# Patient Record
Sex: MALE | Race: WHITE | NOT HISPANIC OR LATINO | Employment: OTHER | URBAN - METROPOLITAN AREA
[De-identification: names, ages, dates, MRNs, and addresses within clinical notes are randomized per-mention and may not be internally consistent; named-entity substitution may affect disease eponyms.]

---

## 2020-02-26 NOTE — PROGRESS NOTES
======================================================  GOAL of current visit: Est Care/Annual Wellness Exam    Previous PCP: Razia Cordon @ [facility]  Specialists: Dermatology - Macy Antony @ [facility]; General Surgeon - Dr. Vipin arguelles; Urology - Dr. Shlomo Shane @ [place]  Recent lab work: February 2020  Recent imaging: February 2020  Colonoscopy History: needed    Health Maintenance Due   Topic Date Due    Foot Exam  12/18/1979    Eye Exam  12/18/1979    TETANUS VACCINE  12/18/1987    Pneumococcal Vaccine (Medium Risk) (1 of 1 - PPSV23) 12/18/1988    Low Dose Statin  12/18/1990    Colonoscopy  12/18/2019     ======================================================  PLAN:      Problem List Items Addressed This Visit     Type 2 diabetes mellitus with diabetic polyneuropathy, without long-term current use of insulin (Chronic)     Patient has uncontrolled diabetes and is at high risk.  Discussed in length course of illness with the patient.  Patient is agreeable to taking multiple medications if needed.  Referral to Nephrology, Podiatry, Optometry today.  Restart glipizide, start Jardiance and OZEMPIC.  Recheck A1c every three months until controlled.  Monitor hemoglobin A1c.  Discussed diabetic diet and exercise protocol.  Starting new medications.  Monitor for side effects.  Discussed checking blood glucose.  Discussed symptoms to monitor for and to notify me immediately if persistent or worsening.  Follow up with Ophthalmology/Optometry and Podiatry.           Relevant Medications    semaglutide (OZEMPIC) 0.25 mg or 0.5 mg(2 mg/1.5 mL) PnIj    empagliflozin (JARDIANCE) 25 mg Tab    glipiZIDE (GLUCOTROL) 5 MG tablet    Other Relevant Orders    MICROALBUMIN / CREATININE RATIO URINE (Completed)    Hemoglobin A1c    Ambulatory referral/consult to Optometry    Ambulatory referral/consult to Podiatry    Hemoglobin A1c    Depression, major, recurrent, moderate     Patient would like to focus on his other chronic  conditions 1st.  Patient reports that he defers any medication at this time.  He will consider counseling.Discussed depression condition course.  Discussed SSRI as first-line treatment for this condition.  Discussed risk of discontinuing this medication without tapering.  Patient was educated, advised of side effects, and all questions were answered.  Patient voiced understanding.  Patient will follow up routinely and notify us if having any side effects or worsening or persistent symptoms.  ER precautions were given.           Hyperlipidemia LDL goal <130     I recommend statin to reduce LDL below 70 with diabetes.  Vascepa is needed for reduction in triglycerides. Discussed hyperlipidemia disease course, healthy diet and increased need for exercise.  Discussed the risk of cardiovascular disease, increase stroke and heart attack risk.    Patient voiced understanding and understood the treatment plan. All questions were answered.                Relevant Medications    atorvastatin (LIPITOR) 20 MG tablet    icosapent ethyl (VASCEPA) 1 gram Cap    Other Relevant Orders    Lipid panel    Encounter for long-term (current) use of medications     Patient with multiple lab abnormalities and need medication adjustment.  Patient with hypothyroidism start medications see problem.  Uncontrolled diabetes see problem.  Otherwise blood counts and kidney and liver and electrolytes are stable.    Complete history and physical was completed today.  Complete and thorough medication reconciliation was performed.  Discussed risks and benefits of medications.  Advised patient on orders and health maintenance.  We discussed old records and old labs if available.  Will request any records not available through epic.  Continue current medications listed on your summary sheet.           Relevant Orders    CBC Without Differential (Completed)    Comprehensive metabolic panel (Completed)    TSH (Completed)    Urinalysis (Completed)     Encounter for general adult medical examination with abnormal findings - Primary    Relevant Orders    CBC Without Differential (Completed)    Comprehensive metabolic panel (Completed)    TSH (Completed)    Urinalysis (Completed)    Colon cancer screening    Relevant Orders    Case request GI: COLONOSCOPY (Completed)    Encounter for lipid screening for cardiovascular disease    Relevant Orders    Lipid panel (Completed)    Primary osteoarthritis of both knees     I recommend anti-inflammatories and physical therapy.  Referral to Orthopedics for further evaluation.         Relevant Orders    Ambulatory referral/consult to Orthopedics    Not currently working due to disabled status     Patient request handicap tag to be filled out.  Form was filled out copy placed in chart and original given to patient.         Hypothyroidism     Start Synthroid 25 mcg.  Recheck in 2 to 3 months.Discussed hypothyroidism disease course.  Discussed risks and benefits of medication use.  ER precautions.           Relevant Medications    levothyroxine (SYNTHROID) 25 MCG tablet        Future Appointments     Date Provider Specialty Appt Notes    3/11/2020 Nicolette Baeza NP Orthopedics Primary osteoarthritis of both knees    3/24/2020 Holli Traore DPM Podiatry Type 2 diabetes mellitus with diabetic polyneuropathy, without long-term current use of insulin    3/26/2020 Donnie Shrestha MD Family Medicine 4 week f/u review lab results               Donnie Shrestha M.D.     ==========================================================================  Subjective:      Patient ID: Rashaun Mccormick is a 49 y.o. male.  has a past medical history of Arthritis, Chronic pain, Chronic pain of both knees, and Diabetes mellitus, type 2.     Chief Complaint: Establish Care (update handicap tag)    Patient here today to establish care and for annual wellness exam.    Problem List Items Addressed This Visit     Type 2 diabetes mellitus with  diabetic polyneuropathy, without long-term current use of insulin (Chronic)    Overview     Chronic.  Uncontrolled.  Patient states that he has been on insulin and other medications in the past.  Specifically metformin gave him severe diarrhea at different doses and he cannot take this medication.  Patient is currently on glipizide but reports intermittent compliance.  Patient did have a recent illness that made his blood sugars go up.  No recent steroids.    Diabetes Management Status    Statin: Not taking  ACE/ARB: Not taking    Screening or Prevention Patient's value Goal Complete/Controlled?   HgA1C Testing and Control   Lab Results   Component Value Date    HGBA1C 10.8 (H) 02/27/2020      Annually/Less than 8% No   Lipid profile : 02/27/2020 Annually Yes   LDL control Lab Results   Component Value Date    LDLCALC 123.4 02/27/2020    Annually/Less than 100 mg/dl  No   Nephropathy screening Lab Results   Component Value Date    LABMICR 106.0 02/27/2020     Lab Results   Component Value Date    PROTEINUA Trace (A) 02/27/2020    Annually Yes   Blood pressure BP Readings from Last 1 Encounters:   02/27/20 130/88    Less than 140/90 Yes   Dilated retinal exam Most Recent Eye Exam Date: Not Found Annually No   Foot exam   Most Recent Foot Exam Date: Not Found Annually No              Current Assessment & Plan     Patient has uncontrolled diabetes and is at high risk.  Discussed in length course of illness with the patient.  Patient is agreeable to taking multiple medications if needed.  Referral to Nephrology, Podiatry, Optometry today.  Restart glipizide, start Jardiance and OZEMPIC.  Recheck A1c every three months until controlled.  Monitor hemoglobin A1c.  Discussed diabetic diet and exercise protocol.  Starting new medications.  Monitor for side effects.  Discussed checking blood glucose.  Discussed symptoms to monitor for and to notify me immediately if persistent or worsening.  Follow up with  Ophthalmology/Optometry and Podiatry.           Depression, major, recurrent, moderate    Overview     Chronic.  Intermittent controlled.  Patient states that he does not wish to take any medications for this condition.  Patient cash with exercise and self meditation.  Denies any SI HI or hallucinations.         Current Assessment & Plan     Patient would like to focus on his other chronic conditions 1st.  Patient reports that he defers any medication at this time.  He will consider counseling.Discussed depression condition course.  Discussed SSRI as first-line treatment for this condition.  Discussed risk of discontinuing this medication without tapering.  Patient was educated, advised of side effects, and all questions were answered.  Patient voiced understanding.  Patient will follow up routinely and notify us if having any side effects or worsening or persistent symptoms.  ER precautions were given.           Hyperlipidemia LDL goal <130    Overview     CHRONIC.  Uncontrolled. Lab analysis reviewed.   February 2020:  Patient is on no medications currently.  Patient reports eating a very well controlled diet.  (-) CP, SOB, abdominal pain, N/V/D, constipation, jaundice, skin changes.  (-) Myalgias  Lab Results   Component Value Date    CHOL 212 (H) 02/27/2020     Lab Results   Component Value Date    HDL 37 (L) 02/27/2020     Lab Results   Component Value Date    LDLCALC 123.4 02/27/2020     Lab Results   Component Value Date    TRIG 258 (H) 02/27/2020     Lab Results   Component Value Date    CHOLHDL 17.5 (L) 02/27/2020     Lab Results   Component Value Date    TOTALCHOLEST 5.7 (H) 02/27/2020     Lab Results   Component Value Date    ALT 23 02/27/2020    AST 21 02/27/2020    ALKPHOS 67 02/27/2020    BILITOT 0.3 02/27/2020     ======================================================           Current Assessment & Plan     I recommend statin to reduce LDL below 70 with diabetes.  Vascepa is needed for reduction in  triglycerides. Discussed hyperlipidemia disease course, healthy diet and increased need for exercise.  Discussed the risk of cardiovascular disease, increase stroke and heart attack risk.    Patient voiced understanding and understood the treatment plan. All questions were answered.                Encounter for long-term (current) use of medications    Overview     CHRONIC. Stable. Compliant with medications for managed conditions. See medication list. No SE reported.   Routine lab analysis is being monitored. Refills were addressed.  Lab Results   Component Value Date    WBC 8.79 02/27/2020    HGB 16.6 02/27/2020    HCT 50.1 02/27/2020    MCV 93 02/27/2020     02/27/2020         Chemistry        Component Value Date/Time     02/27/2020 0828    K 4.5 02/27/2020 0828     02/27/2020 0828    CO2 20 (L) 02/27/2020 0828    BUN 11 02/27/2020 0828    CREATININE 0.9 02/27/2020 0828     (H) 02/27/2020 0828        Component Value Date/Time    CALCIUM 9.5 02/27/2020 0828    ALKPHOS 67 02/27/2020 0828    AST 21 02/27/2020 0828    ALT 23 02/27/2020 0828    BILITOT 0.3 02/27/2020 0828    ESTGFRAFRICA >60.0 02/27/2020 0828    EGFRNONAA >60.0 02/27/2020 0828          Lab Results   Component Value Date    TSH 16.042 (H) 02/27/2020    FREET4 0.71 02/27/2020              Current Assessment & Plan     Patient with multiple lab abnormalities and need medication adjustment.  Patient with hypothyroidism start medications see problem.  Uncontrolled diabetes see problem.  Otherwise blood counts and kidney and liver and electrolytes are stable.    Complete history and physical was completed today.  Complete and thorough medication reconciliation was performed.  Discussed risks and benefits of medications.  Advised patient on orders and health maintenance.  We discussed old records and old labs if available.  Will request any records not available through epic.  Continue current medications listed on your summary  sheet.           Encounter for general adult medical examination with abnormal findings - Primary    Colon cancer screening    Overview     Patient is due for age-appropriate screening for colon cancer.         Encounter for lipid screening for cardiovascular disease    Primary osteoarthritis of both knees    Overview     Chronic.  Severe.  Uncontrolled.  Patient requesting referral to Orthopedics for further evaluation treatment.  Patient does take over-the-counter medications occasionally but not every day.  Patient does not want pain medications.         Current Assessment & Plan     I recommend anti-inflammatories and physical therapy.  Referral to Orthopedics for further evaluation.         Not currently working due to disabled status    Overview     Chronic.  Patient is disabled from previous ruptured bowel and surgery which left him debilitated in addition to his severe arthritis.  Patient does suffer from chronic pain but is not taking prescription pain medication.  Records reviewed.         Current Assessment & Plan     Patient request handicap tag to be filled out.  Form was filled out copy placed in chart and original given to patient.         Hypothyroidism    Overview     Lab Results   Component Value Date    TSH 16.042 (H) 02/27/2020    FREET4 0.71 02/27/2020              Current Assessment & Plan     Start Synthroid 25 mcg.  Recheck in 2 to 3 months.Discussed hypothyroidism disease course.  Discussed risks and benefits of medication use.  ER precautions.                  Past Medical History:  Past Medical History:   Diagnosis Date    Arthritis     Chronic pain     Chronic pain of both knees     Diabetes mellitus, type 2      Past Surgical History:   Procedure Laterality Date    CHOLECYSTECTOMY      COLON SURGERY      KIDNEY SURGERY       Review of patient's allergies indicates:   Allergen Reactions    Metformin Diarrhea    Gabapentin Other (See Comments)     Shoulder pain    Codeine       "Unknown^  Unknown^      Penicillins Other (See Comments)     Anaphylaxis^  Anaphylaxis^  Never had a reaction, father had a bad reaction          Social History     Tobacco Use    Smoking status: Never Smoker    Smokeless tobacco: Never Used   Substance Use Topics    Alcohol use: Never     Frequency: Never      Family History   Problem Relation Age of Onset    Cancer Mother     COPD Father        I have reviewed the complete PMH, social history, surgical history, allergies and medications.  As well as family history.    Review of Systems   Constitutional: Negative for activity change and fatigue.   HENT: Negative for congestion and sinus pain.    Eyes: Negative for visual disturbance.   Respiratory: Negative for chest tightness and shortness of breath.    Cardiovascular: Negative for palpitations and leg swelling.   Gastrointestinal: Positive for diarrhea (With metformin.  No diarrhea once off medication). Negative for abdominal pain and nausea.   Endocrine: Negative for polyuria.   Genitourinary: Negative for difficulty urinating and frequency.   Musculoskeletal: Positive for arthralgias. Negative for joint swelling.   Skin: Negative for rash.   Neurological: Negative for dizziness and headaches.   Psychiatric/Behavioral: Negative for agitation. The patient is not nervous/anxious.      Objective:   /88   Pulse 83   Temp 97.9 °F (36.6 °C) (Oral)   Ht 5' 11" (1.803 m)   Wt 95.6 kg (210 lb 12.8 oz)   BMI 29.40 kg/m²   Physical Exam   Constitutional: He is oriented to person, place, and time. He appears well-developed and well-nourished. No distress.   HENT:   Head: Normocephalic and atraumatic.   Eyes: Pupils are equal, round, and reactive to light. EOM are normal.   Neck: Normal range of motion. Neck supple.   Cardiovascular: Normal rate, regular rhythm, normal heart sounds and intact distal pulses.   No murmur heard.  Pulses:       Dorsalis pedis pulses are 2+ on the right side, and 2+ on the left " side.   Pulmonary/Chest: Effort normal and breath sounds normal. No respiratory distress. He has no wheezes.   Abdominal: Soft. Bowel sounds are normal.   Large scar noted on the abdomen well healed.   Musculoskeletal: Normal range of motion. He exhibits tenderness and deformity. He exhibits no edema.        Right foot: There is normal range of motion and no deformity.        Left foot: There is normal range of motion and no deformity.   Feet:   Right Foot:   Protective Sensation: 7 sites tested. 5 sites sensed.   Skin Integrity: Positive for callus and dry skin. Negative for ulcer, blister, skin breakdown, erythema or warmth.   Left Foot:   Protective Sensation: 7 sites tested. 5 sites sensed.   Skin Integrity: Positive for callus and dry skin. Negative for ulcer, blister, skin breakdown, erythema or warmth.   Neurological: He is alert and oriented to person, place, and time. No cranial nerve deficit.   Skin: Skin is warm and dry. Capillary refill takes less than 2 seconds.   Psychiatric: He has a normal mood and affect. His behavior is normal.   Nursing note and vitals reviewed.      Assessment:     1. Encounter for general adult medical examination with abnormal findings    2. Encounter for long-term (current) use of medications    3. Type 2 diabetes mellitus with diabetic polyneuropathy, without long-term current use of insulin    4. Colon cancer screening    5. Encounter for lipid screening for cardiovascular disease    6. Primary osteoarthritis of both knees    7. Not currently working due to disabled status    8. Depression, major, recurrent, moderate    9. Hypothyroidism, unspecified type    10. Hyperlipidemia LDL goal <130      MDM:   New patient.  High medical complexity.  High risk.  Here for annual wellness as well.  See other note.  I have Reviewed and summarized old records.  I have performed thorough medication reconciliation today and discussed risk and benefits of each medication.  I have reviewed  labs and discussed with patient.  All questions were answered.  I am requesting old records and will review them once they are available.    I have signed for the following orders AND/OR meds.  Orders Placed This Encounter   Procedures    MICROALBUMIN / CREATININE RATIO URINE           Order Specific Question:   Specimen Source     Answer:   Urine    CBC Without Differential     Standing Status:   Future     Number of Occurrences:   1     Standing Expiration Date:   4/27/2021    Comprehensive metabolic panel     Standing Status:   Future     Number of Occurrences:   1     Standing Expiration Date:   4/27/2021    TSH     Standing Status:   Future     Number of Occurrences:   1     Standing Expiration Date:   4/27/2021    Urinalysis     Order Specific Question:   Collection Type     Answer:   Urine, Clean Catch    Hemoglobin A1c     Standing Status:   Standing     Number of Occurrences:   99     Standing Expiration Date:   2/22/2040    Lipid panel     Standing Status:   Future     Number of Occurrences:   1     Standing Expiration Date:   4/27/2021    Hemoglobin A1c     Standing Status:   Standing     Number of Occurrences:   99     Standing Expiration Date:   2/23/2040    Lipid panel     Standing Status:   Standing     Number of Occurrences:   99     Standing Expiration Date:   2/23/2040    Ambulatory referral/consult to Orthopedics     Standing Status:   Future     Standing Expiration Date:   3/27/2021     Referral Priority:   Routine     Referral Type:   Consultation     Requested Specialty:   Orthopedic Surgery     Number of Visits Requested:   1    Ambulatory referral/consult to Optometry     Standing Status:   Future     Standing Expiration Date:   3/27/2021     Referral Priority:   Routine     Referral Type:   Vision (Optometry)     Referral Reason:   Specialty Services Required     Referred to Provider:   Nikita Medina NP     Requested Specialty:   Optometry     Number of Visits Requested:   1     Ambulatory referral/consult to Podiatry     Standing Status:   Future     Standing Expiration Date:   3/27/2021     Referral Priority:   Routine     Referral Type:   Consultation     Referral Reason:   Specialty Services Required     Requested Specialty:   Podiatry     Number of Visits Requested:   1    Case request GI: COLONOSCOPY     Order Specific Question:   Pre-op Diagnosis     Answer:   Colon cancer screening [959469]     Order Specific Question:   CPT Code:     Answer:   HI COLORECTAL CANCER SCREEN RESULTS DOCUMENT/REVIEW [3017F]     Order Specific Question:   Case Referring Provider     Answer:   ANDRE SORIANO [[id number]]     Order Specific Question:   CPT Code:     Answer:   HI COLORECTAL SCRN; HI RISK IND []     Order Specific Question:   Post-Procedure Disposition:     Answer:   Amb Surgery/DOSC [2]     Order Specific Question:   Medical Necessity:     Answer:   Medically Non-Urgent [100]     Medications Ordered This Encounter   Medications    atorvastatin (LIPITOR) 20 MG tablet     Sig: Take 1 tablet (20 mg total) by mouth once daily.     Dispense:  90 tablet     Refill:  3    empagliflozin (JARDIANCE) 25 mg Tab     Sig: Take 25 mg by mouth once daily.     Dispense:  90 tablet     Refill:  0    glipiZIDE (GLUCOTROL) 5 MG tablet     Sig: Take 1 tablet (5 mg total) by mouth 2 (two) times daily with meals.     Dispense:  180 tablet     Refill:  3    icosapent ethyl (VASCEPA) 1 gram Cap     Sig: Take 2 g by mouth 2 (two) times daily.     Dispense:  120 capsule     Refill:  3    levothyroxine (SYNTHROID) 25 MCG tablet     Sig: Take 1 tablet (25 mcg total) by mouth once daily. Repeat a TSH in my office in 2 months.     Dispense:  30 tablet     Refill:  2    semaglutide (OZEMPIC) 0.25 mg or 0.5 mg(2 mg/1.5 mL) PnIj     Sig: Inject 0.25 mg into the skin once a week for 30 days, THEN 0.5 mg once a week.     Dispense:  1 Syringe     Refill:  1        Follow up in about 1 year (around 2/27/2021), or if  symptoms worsen or fail to improve, for Annual Wellness Exam.    If no improvement in symptoms or symptoms worsen, advised to call/follow-up at clinic or go to ER. Patient voiced understanding and all questions/concerns were addressed.     DISCLAIMER: This note was compiled by using a speech recognition dictation system and therefore please be aware that typographical / speech recognition errors can and do occur.  Please contact me if you see any errors specifically.    Donnie Shrestha M.D.       Office: [phone] [address] [place] [address]  FAX: [phone]

## 2020-02-27 ENCOUNTER — TELEPHONE (OUTPATIENT)
Dept: ORTHOPEDICS | Facility: CLINIC | Age: 50
End: 2020-02-27

## 2020-02-27 ENCOUNTER — OFFICE VISIT (OUTPATIENT)
Dept: FAMILY MEDICINE | Facility: CLINIC | Age: 50
End: 2020-02-27
Payer: MEDICARE

## 2020-02-27 ENCOUNTER — LAB VISIT (OUTPATIENT)
Dept: LAB | Facility: HOSPITAL | Age: 50
End: 2020-02-27
Attending: FAMILY MEDICINE
Payer: MEDICARE

## 2020-02-27 VITALS
BODY MASS INDEX: 29.51 KG/M2 | SYSTOLIC BLOOD PRESSURE: 130 MMHG | TEMPERATURE: 98 F | HEIGHT: 71 IN | HEART RATE: 83 BPM | DIASTOLIC BLOOD PRESSURE: 88 MMHG | WEIGHT: 210.81 LBS

## 2020-02-27 DIAGNOSIS — Z12.11 COLON CANCER SCREENING: ICD-10-CM

## 2020-02-27 DIAGNOSIS — E11.42 TYPE 2 DIABETES MELLITUS WITH DIABETIC POLYNEUROPATHY, WITHOUT LONG-TERM CURRENT USE OF INSULIN: Chronic | ICD-10-CM

## 2020-02-27 DIAGNOSIS — Z13.6 ENCOUNTER FOR LIPID SCREENING FOR CARDIOVASCULAR DISEASE: ICD-10-CM

## 2020-02-27 DIAGNOSIS — E78.5 HYPERLIPIDEMIA LDL GOAL <130: ICD-10-CM

## 2020-02-27 DIAGNOSIS — Z79.899 ENCOUNTER FOR LONG-TERM (CURRENT) USE OF MEDICATIONS: ICD-10-CM

## 2020-02-27 DIAGNOSIS — E03.9 HYPOTHYROIDISM, UNSPECIFIED TYPE: ICD-10-CM

## 2020-02-27 DIAGNOSIS — F33.1 DEPRESSION, MAJOR, RECURRENT, MODERATE: ICD-10-CM

## 2020-02-27 DIAGNOSIS — Z13.220 ENCOUNTER FOR LIPID SCREENING FOR CARDIOVASCULAR DISEASE: ICD-10-CM

## 2020-02-27 DIAGNOSIS — M17.0 PRIMARY OSTEOARTHRITIS OF BOTH KNEES: ICD-10-CM

## 2020-02-27 DIAGNOSIS — Z56.0 NOT CURRENTLY WORKING DUE TO DISABLED STATUS: ICD-10-CM

## 2020-02-27 DIAGNOSIS — Z00.01 ENCOUNTER FOR GENERAL ADULT MEDICAL EXAMINATION WITH ABNORMAL FINDINGS: Primary | ICD-10-CM

## 2020-02-27 DIAGNOSIS — Z00.01 ENCOUNTER FOR GENERAL ADULT MEDICAL EXAMINATION WITH ABNORMAL FINDINGS: ICD-10-CM

## 2020-02-27 PROBLEM — N20.1 LEFT URETERAL CALCULUS: Status: ACTIVE | Noted: 2017-07-17

## 2020-02-27 PROBLEM — R10.12 LUQ PAIN: Status: RESOLVED | Noted: 2017-07-03 | Resolved: 2020-02-27

## 2020-02-27 PROBLEM — R31.0 GROSS HEMATURIA: Status: ACTIVE | Noted: 2017-07-03

## 2020-02-27 PROBLEM — R10.9 CHRONIC LEFT FLANK PAIN: Status: ACTIVE | Noted: 2017-07-03

## 2020-02-27 PROBLEM — G89.29 CHRONIC LEFT FLANK PAIN: Status: ACTIVE | Noted: 2017-07-03

## 2020-02-27 PROBLEM — R31.0 GROSS HEMATURIA: Status: RESOLVED | Noted: 2017-07-03 | Resolved: 2020-02-27

## 2020-02-27 PROBLEM — R10.12 LUQ PAIN: Status: ACTIVE | Noted: 2017-07-03

## 2020-02-27 PROBLEM — N20.1 LEFT URETERAL CALCULUS: Status: RESOLVED | Noted: 2017-07-17 | Resolved: 2020-02-27

## 2020-02-27 PROBLEM — N32.89 BLADDER SPASMS: Status: ACTIVE | Noted: 2017-07-03

## 2020-02-27 LAB
ALBUMIN SERPL BCP-MCNC: 4.4 G/DL (ref 3.5–5.2)
ALBUMIN/CREAT UR: 72.1 UG/MG (ref 0–30)
ALP SERPL-CCNC: 67 U/L (ref 55–135)
ALT SERPL W/O P-5'-P-CCNC: 23 U/L (ref 10–44)
ANION GAP SERPL CALC-SCNC: 11 MMOL/L (ref 8–16)
AST SERPL-CCNC: 21 U/L (ref 10–40)
BILIRUB SERPL-MCNC: 0.3 MG/DL (ref 0.1–1)
BILIRUB UR QL STRIP: NEGATIVE
BUN SERPL-MCNC: 11 MG/DL (ref 6–20)
CALCIUM SERPL-MCNC: 9.5 MG/DL (ref 8.7–10.5)
CHLORIDE SERPL-SCNC: 105 MMOL/L (ref 95–110)
CHOLEST SERPL-MCNC: 212 MG/DL (ref 120–199)
CHOLEST/HDLC SERPL: 5.7 {RATIO} (ref 2–5)
CLARITY UR: CLEAR
CO2 SERPL-SCNC: 20 MMOL/L (ref 23–29)
COLOR UR: YELLOW
CREAT SERPL-MCNC: 0.9 MG/DL (ref 0.5–1.4)
CREAT UR-MCNC: 147 MG/DL (ref 23–375)
ERYTHROCYTE [DISTWIDTH] IN BLOOD BY AUTOMATED COUNT: 12.9 % (ref 11.5–14.5)
EST. GFR  (AFRICAN AMERICAN): >60 ML/MIN/1.73 M^2
EST. GFR  (NON AFRICAN AMERICAN): >60 ML/MIN/1.73 M^2
ESTIMATED AVG GLUCOSE: 263 MG/DL (ref 68–131)
GLUCOSE SERPL-MCNC: 224 MG/DL (ref 70–110)
GLUCOSE UR QL STRIP: ABNORMAL
HBA1C MFR BLD HPLC: 10.8 % (ref 4–5.6)
HCT VFR BLD AUTO: 50.1 % (ref 40–54)
HDLC SERPL-MCNC: 37 MG/DL (ref 40–75)
HDLC SERPL: 17.5 % (ref 20–50)
HGB BLD-MCNC: 16.6 G/DL (ref 14–18)
HGB UR QL STRIP: NEGATIVE
KETONES UR QL STRIP: ABNORMAL
LDLC SERPL CALC-MCNC: 123.4 MG/DL (ref 63–159)
LEUKOCYTE ESTERASE UR QL STRIP: NEGATIVE
MCH RBC QN AUTO: 30.7 PG (ref 27–31)
MCHC RBC AUTO-ENTMCNC: 33.1 G/DL (ref 32–36)
MCV RBC AUTO: 93 FL (ref 82–98)
MICROALBUMIN UR DL<=1MG/L-MCNC: 106 UG/ML
NITRITE UR QL STRIP: NEGATIVE
NONHDLC SERPL-MCNC: 175 MG/DL
PH UR STRIP: 6 [PH] (ref 5–8)
PLATELET # BLD AUTO: 231 K/UL (ref 150–350)
PMV BLD AUTO: 12.1 FL (ref 9.2–12.9)
POTASSIUM SERPL-SCNC: 4.5 MMOL/L (ref 3.5–5.1)
PROT SERPL-MCNC: 7.8 G/DL (ref 6–8.4)
PROT UR QL STRIP: ABNORMAL
RBC # BLD AUTO: 5.41 M/UL (ref 4.6–6.2)
SODIUM SERPL-SCNC: 136 MMOL/L (ref 136–145)
SP GR UR STRIP: 1.02 (ref 1–1.03)
T4 FREE SERPL-MCNC: 0.71 NG/DL (ref 0.71–1.51)
TRIGL SERPL-MCNC: 258 MG/DL (ref 30–150)
TSH SERPL DL<=0.005 MIU/L-ACNC: 16.04 UIU/ML (ref 0.4–4)
URN SPEC COLLECT METH UR: ABNORMAL
WBC # BLD AUTO: 8.79 K/UL (ref 3.9–12.7)

## 2020-02-27 PROCEDURE — 3008F PR BODY MASS INDEX (BMI) DOCUMENTED: ICD-10-PCS | Mod: HCNC,CPTII,S$GLB, | Performed by: FAMILY MEDICINE

## 2020-02-27 PROCEDURE — 3046F HEMOGLOBIN A1C LEVEL >9.0%: CPT | Mod: HCNC,CPTII,S$GLB, | Performed by: FAMILY MEDICINE

## 2020-02-27 PROCEDURE — 99499 RISK ADDL DX/OHS AUDIT: ICD-10-PCS | Mod: S$GLB,,, | Performed by: FAMILY MEDICINE

## 2020-02-27 PROCEDURE — 99214 PR OFFICE/OUTPT VISIT, EST, LEVL IV, 30-39 MIN: ICD-10-PCS | Mod: 25,HCNC,S$GLB, | Performed by: FAMILY MEDICINE

## 2020-02-27 PROCEDURE — 99499 RISK ADDL DX/OHS AUDIT: ICD-10-PCS | Mod: ,,, | Performed by: FAMILY MEDICINE

## 2020-02-27 PROCEDURE — 81002 URINALYSIS NONAUTO W/O SCOPE: CPT | Mod: HCNC,PO

## 2020-02-27 PROCEDURE — 99214 OFFICE O/P EST MOD 30 MIN: CPT | Mod: 25,HCNC,S$GLB, | Performed by: FAMILY MEDICINE

## 2020-02-27 PROCEDURE — 99386 PR PREVENTIVE VISIT,NEW,40-64: ICD-10-PCS | Mod: 25,HCNC,S$GLB, | Performed by: FAMILY MEDICINE

## 2020-02-27 PROCEDURE — 85027 COMPLETE CBC AUTOMATED: CPT | Mod: HCNC

## 2020-02-27 PROCEDURE — 80053 COMPREHEN METABOLIC PANEL: CPT | Mod: HCNC

## 2020-02-27 PROCEDURE — 99499 UNLISTED E&M SERVICE: CPT | Mod: S$GLB,,, | Performed by: FAMILY MEDICINE

## 2020-02-27 PROCEDURE — 83036 HEMOGLOBIN GLYCOSYLATED A1C: CPT | Mod: HCNC

## 2020-02-27 PROCEDURE — 99999 PR PBB SHADOW E&M-NEW PATIENT-LVL IV: CPT | Mod: PBBFAC,HCNC,, | Performed by: FAMILY MEDICINE

## 2020-02-27 PROCEDURE — 84443 ASSAY THYROID STIM HORMONE: CPT | Mod: HCNC

## 2020-02-27 PROCEDURE — 36415 COLL VENOUS BLD VENIPUNCTURE: CPT | Mod: HCNC,PO

## 2020-02-27 PROCEDURE — 84439 ASSAY OF FREE THYROXINE: CPT | Mod: HCNC

## 2020-02-27 PROCEDURE — 3008F BODY MASS INDEX DOCD: CPT | Mod: HCNC,CPTII,S$GLB, | Performed by: FAMILY MEDICINE

## 2020-02-27 PROCEDURE — 82043 UR ALBUMIN QUANTITATIVE: CPT | Mod: HCNC

## 2020-02-27 PROCEDURE — 99386 PREV VISIT NEW AGE 40-64: CPT | Mod: 25,HCNC,S$GLB, | Performed by: FAMILY MEDICINE

## 2020-02-27 PROCEDURE — 3046F PR MOST RECENT HEMOGLOBIN A1C LEVEL > 9.0%: ICD-10-PCS | Mod: HCNC,CPTII,S$GLB, | Performed by: FAMILY MEDICINE

## 2020-02-27 PROCEDURE — 80061 LIPID PANEL: CPT | Mod: HCNC

## 2020-02-27 PROCEDURE — 99999 PR PBB SHADOW E&M-NEW PATIENT-LVL IV: ICD-10-PCS | Mod: PBBFAC,HCNC,, | Performed by: FAMILY MEDICINE

## 2020-02-27 PROCEDURE — 99499 UNLISTED E&M SERVICE: CPT | Mod: ,,, | Performed by: FAMILY MEDICINE

## 2020-02-27 RX ORDER — GLIPIZIDE 5 MG/1
5 TABLET ORAL
COMMUNITY
Start: 2020-02-14 | End: 2020-02-28 | Stop reason: SDUPTHER

## 2020-02-27 RX ORDER — LORATADINE 10 MG/1
10 TABLET ORAL
COMMUNITY
Start: 2020-02-14 | End: 2020-11-23

## 2020-02-27 SDOH — SOCIAL DETERMINANTS OF HEALTH (SDOH): UNEMPLOYMENT, UNSPECIFIED: Z56.0

## 2020-02-27 NOTE — PROGRESS NOTES
Please call to make sure patient get the results.    Urine is abnormal consistent with uncontrolled diabetes and dehydration.  No infection.  No blood    [phone]

## 2020-02-27 NOTE — PATIENT INSTRUCTIONS
Follow up in about 1 year (around 2/27/2021), or if symptoms worsen or fail to improve, for Annual Wellness Exam.     If no improvement in symptoms or symptoms worsen, please be advised to call MD, follow-up at clinic and/or go to ER if becomes severe.    Donnie Shrestha M.D.        We Offer TELEHEALTH & Same Day Appointments!   Book your Telehealth appointment with me through my nurse or   Clinic appointments on [employer]!    [address] [place] [address]    Office: [phone]   FAX: [phone]    Check out my Facebook Page and Follow Me at: [email or web address]    Check out my website at [employer] [email or web address] clicking on: [email or web address]    To Schedule appointments online, go to [employer]: [email or web address]

## 2020-02-28 ENCOUNTER — TELEPHONE (OUTPATIENT)
Dept: ENDOSCOPY | Facility: HOSPITAL | Age: 50
End: 2020-02-28

## 2020-02-28 DIAGNOSIS — E11.42 TYPE 2 DIABETES MELLITUS WITH DIABETIC POLYNEUROPATHY, WITHOUT LONG-TERM CURRENT USE OF INSULIN: Primary | Chronic | ICD-10-CM

## 2020-02-28 PROBLEM — E03.9 HYPOTHYROIDISM: Status: ACTIVE | Noted: 2020-02-28

## 2020-02-28 RX ORDER — GLIPIZIDE 5 MG/1
5 TABLET ORAL 2 TIMES DAILY WITH MEALS
Qty: 180 TABLET | Refills: 3 | Status: SHIPPED | OUTPATIENT
Start: 2020-02-28 | End: 2020-10-30 | Stop reason: SDUPTHER

## 2020-02-28 RX ORDER — LEVOTHYROXINE SODIUM 25 UG/1
25 TABLET ORAL DAILY
Qty: 30 TABLET | Refills: 2 | Status: SHIPPED | OUTPATIENT
Start: 2020-02-28 | End: 2020-10-30 | Stop reason: SDUPTHER

## 2020-02-28 RX ORDER — ASPIRIN 81 MG/1
81 TABLET ORAL DAILY
Qty: 90 TABLET | Refills: 3 | Status: SHIPPED | OUTPATIENT
Start: 2020-02-28 | End: 2021-12-03 | Stop reason: SDUPTHER

## 2020-02-28 RX ORDER — ATORVASTATIN CALCIUM 20 MG/1
20 TABLET, FILM COATED ORAL DAILY
Qty: 90 TABLET | Refills: 3 | Status: SHIPPED | OUTPATIENT
Start: 2020-02-28 | End: 2020-10-30 | Stop reason: SDUPTHER

## 2020-02-28 RX ORDER — ICOSAPENT ETHYL 1000 MG/1
2 CAPSULE ORAL 2 TIMES DAILY
Qty: 120 CAPSULE | Refills: 3 | Status: SHIPPED | OUTPATIENT
Start: 2020-02-28 | End: 2020-10-30 | Stop reason: SDUPTHER

## 2020-02-28 NOTE — ASSESSMENT & PLAN NOTE
Patient request handicap tag to be filled out.  Form was filled out copy placed in chart and original given to patient.

## 2020-02-28 NOTE — TELEPHONE ENCOUNTER
Attempted to schedule procedure. Spoke with pt's wife(Gisselle). She will relay message to pt about scheduling colonoscopy. chema

## 2020-02-28 NOTE — PROGRESS NOTES
Please CALL patient with results and Document verification.   [phone]    A1c shows uncontrolled diabetes with level at 10.8 which correlates with a three month daily average of 263 blood sugar.  Change in medication regimen is recommended and sent to the pharmacy.  Repeat A1c in three months  Metabolic panel shows stable electrolytes, liver enzymes and renal function.  Patient does have elevated sugar consistent with diabetes.  Cholesterol is abnormal and above goal for diabetes.  Treatment is recommended in addition to diet and exercise.  Triglycerides are also elevated.  Repeat six months.  Blood counts are normal  Thyroid studies are abnormal consistent with hypothyroidism.  Treatment is recommended with levothyroxine and repeat thyroid studies in three months.    Plan is to repeat A1c and thyroid studies in three months  Repeat lipid panel A1c thyroid studies in six months.  Repeat all labs at one year    See office visit note for prescription sent to the pharmacy.  Notify patient of labs in treatment plan.  Advise him to follow up at three weeks to discuss in detail.

## 2020-02-28 NOTE — PROGRESS NOTES
Please CALL patient with results and Document verification.   [phone]    Urine microalbumin is elevated due to diabetes.  Patient needs to establish care with Nephrology.  Please place referral and set this up.

## 2020-02-28 NOTE — ASSESSMENT & PLAN NOTE
Patient with multiple lab abnormalities and need medication adjustment.  Patient with hypothyroidism start medications see problem.  Uncontrolled diabetes see problem.  Otherwise blood counts and kidney and liver and electrolytes are stable.    Complete history and physical was completed today.  Complete and thorough medication reconciliation was performed.  Discussed risks and benefits of medications.  Advised patient on orders and health maintenance.  We discussed old records and old labs if available.  Will request any records not available through epic.  Continue current medications listed on your summary sheet.

## 2020-02-28 NOTE — ASSESSMENT & PLAN NOTE
Start Synthroid 25 mcg.  Recheck in 2 to 3 months.Discussed hypothyroidism disease course.  Discussed risks and benefits of medication use.  ER precautions.

## 2020-02-28 NOTE — ASSESSMENT & PLAN NOTE
Patient has uncontrolled diabetes and is at high risk.  Discussed in length course of illness with the patient.  Patient is agreeable to taking multiple medications if needed.  Referral to Nephrology, Podiatry, Optometry today.  Restart glipizide, start Jardiance and OZEMPIC.  Recheck A1c every three months until controlled.  Monitor hemoglobin A1c.  Discussed diabetic diet and exercise protocol.  Starting new medications.  Monitor for side effects.  Discussed checking blood glucose.  Discussed symptoms to monitor for and to notify me immediately if persistent or worsening.  Follow up with Ophthalmology/Optometry and Podiatry.

## 2020-02-28 NOTE — PROGRESS NOTES
This note is specifically for wellness visit performed today.     WELLNESS EXAM      Patient ID: Rashaun Mccormick is a 49 y.o. male.  has a past medical history of Arthritis, Chronic pain, Chronic pain of both knees, and Diabetes mellitus, type 2.     Chief Complaint:  Encounter for wellness exam    Well Adult Physical: Patient here for a comprehensive physical exam.The patient reports multiple chronic problems. See other note for details  Do you take any herbs or supplements that were not prescribed by a doctor? no   Are you taking calcium supplements? no   Are you taking aspirin daily?  Not currently.  Patient advised to start over-the-counter aspirin with chronic conditions.   History:  Kidney stones with multiple procedures in the past.  UROLOGIST:  Dr. Shlomo Shane @ [place]  Date last prostate exam:  2019  Date last PSA:  Requesting records  No results found for: PSA   No history of STDs    Health Maintenance Topics with due status: Not Due       Topic Last Completion Date    Lipid Panel 02/27/2020    Hemoglobin A1c 02/27/2020    Urine Microalbumin 02/27/2020        ==============================================  History reviewed.     Health Maintenance Due   Topic Date Due    Foot Exam  12/18/1979    Eye Exam  12/18/1979    TETANUS VACCINE  12/18/1987    Pneumococcal Vaccine (Medium Risk) (1 of 1 - PPSV23) 12/18/1988    Low Dose Statin  12/18/1990    Colonoscopy  12/18/2019       Past Medical History:  Past Medical History:   Diagnosis Date    Arthritis     Chronic pain     Chronic pain of both knees     Diabetes mellitus, type 2      Past Surgical History:   Procedure Laterality Date    CHOLECYSTECTOMY      COLON SURGERY      KIDNEY SURGERY       Review of patient's allergies indicates:   Allergen Reactions    Metformin Diarrhea    Gabapentin Other (See Comments)     Shoulder pain    Codeine      Unknown^  Unknown^      Penicillins Other (See Comments)      Anaphylaxis^  Anaphylaxis^  Never had a reaction, father had a bad reaction       Current Outpatient Medications on File Prior to Visit   Medication Sig Dispense Refill    loratadine (CLARITIN) 10 mg tablet Take 10 mg by mouth.      [DISCONTINUED] glipiZIDE (GLUCOTROL) 5 MG tablet Take 5 mg by mouth.       No current facility-administered medications on file prior to visit.      Social History     Socioeconomic History    Marital status:      Spouse name: Not on file    Number of children: Not on file    Years of education: Not on file    Highest education level: Not on file   Occupational History    Not on file   Social Needs    Financial resource strain: Not very hard    Food insecurity:     Worry: Never true     Inability: Never true    Transportation needs:     Medical: No     Non-medical: No   Tobacco Use    Smoking status: Never Smoker    Smokeless tobacco: Never Used   Substance and Sexual Activity    Alcohol use: Never     Frequency: Never    Drug use: Never    Sexual activity: Not Currently   Lifestyle    Physical activity:     Days per week: 5 days     Minutes per session: 30 min    Stress: To some extent   Relationships    Social connections:     Talks on phone: More than three times a week     Gets together: More than three times a week     Attends Gnosticist service: More than 4 times per year     Active member of club or organization: No     Attends meetings of clubs or organizations: Never     Relationship status:    Other Topics Concern    Not on file   Social History Narrative    Not on file     Family History   Problem Relation Age of Onset    Cancer Mother     COPD Father        Vitals:    02/27/20 0732   BP: 130/88   Pulse: 83   Temp: 97.9 °F (36.6 °C)      Body mass index is 29.4 kg/m².     Review of Systems   Constitutional: Negative for chills, fever and unexpected weight change.   HENT: Negative for ear pain and sore throat.    Eyes: Negative for redness and  visual disturbance.   Respiratory: Negative for cough and shortness of breath.    Cardiovascular: Negative for chest pain and palpitations.   Gastrointestinal: Negative for nausea and vomiting.   Musculoskeletal:  See other note   Skin: Negative for rash and wound.   Neurological: Negative for weakness and headaches.         Objective:      Physical Exam   Constitutional: He is oriented to person, place, and time. He appears well-developed and well-nourished. No distress.   HENT:   Head: Normocephalic and atraumatic.   Eyes: Pupils are equal, round, and reactive to light. EOM are normal.   Neck: Normal range of motion. Neck supple.   Cardiovascular: Normal rate, regular rhythm, normal heart sounds and intact distal pulses.   No murmur heard.  Pulmonary/Chest: Effort normal and breath sounds normal. No respiratory distress. He has no wheezes.   Musculoskeletal: Normal range of motion. He exhibits no edema.   Neurological: He is alert and oriented to person, place, and time. No cranial nerve deficit.   Skin: Skin is warm and dry. Capillary refill takes less than 2 seconds.   Psychiatric: He has a normal mood and affect. His behavior is normal.   Nursing note and vitals reviewed.        Assessment / Plan:      1. Z00.01 -patient here for annual wellness exam.  Labs ordered.  Health maintenance was reviewed and ordered.    See other note for chronic conditions.    Complete history and physical was completed today.  Complete and thorough medication reconciliation was performed.  Discussed risks and benefits of medications.  Advised patient on orders and health maintenance.  We discussed old records and old labs if available.  Will request any records not available through epic.  Continue current medications listed on your summary sheet.    All questions were answered. Patient had no further concerns. Advised of diagnoses and plan. Follow up as planned or return sooner if symptoms persist or worsen.     Orders Placed  This Encounter   Procedures    MICROALBUMIN / CREATININE RATIO URINE           Order Specific Question:   Specimen Source     Answer:   Urine    CBC Without Differential     Standing Status:   Future     Number of Occurrences:   1     Standing Expiration Date:   4/27/2021    Comprehensive metabolic panel     Standing Status:   Future     Number of Occurrences:   1     Standing Expiration Date:   4/27/2021    TSH     Standing Status:   Future     Number of Occurrences:   1     Standing Expiration Date:   4/27/2021    Urinalysis     Order Specific Question:   Collection Type     Answer:   Urine, Clean Catch    Hemoglobin A1c     Standing Status:   Standing     Number of Occurrences:   99     Standing Expiration Date:   2/22/2040    Lipid panel     Standing Status:   Future     Number of Occurrences:   1     Standing Expiration Date:   4/27/2021    Hemoglobin A1c     Standing Status:   Standing     Number of Occurrences:   99     Standing Expiration Date:   2/23/2040    Lipid panel     Standing Status:   Standing     Number of Occurrences:   99     Standing Expiration Date:   2/23/2040    Ambulatory referral/consult to Orthopedics     Standing Status:   Future     Standing Expiration Date:   3/27/2021     Referral Priority:   Routine     Referral Type:   Consultation     Requested Specialty:   Orthopedic Surgery     Number of Visits Requested:   1    Ambulatory referral/consult to Optometry     Standing Status:   Future     Standing Expiration Date:   3/27/2021     Referral Priority:   Routine     Referral Type:   Vision (Optometry)     Referral Reason:   Specialty Services Required     Referred to Provider:   Nikita Medina NP     Requested Specialty:   Optometry     Number of Visits Requested:   1    Ambulatory referral/consult to Podiatry     Standing Status:   Future     Standing Expiration Date:   3/27/2021     Referral Priority:   Routine     Referral Type:   Consultation     Referral Reason:    Specialty Services Required     Requested Specialty:   Podiatry     Number of Visits Requested:   1    Case request GI: COLONOSCOPY     Order Specific Question:   Pre-op Diagnosis     Answer:   Colon cancer screening [915913]     Order Specific Question:   CPT Code:     Answer:   ME COLORECTAL CANCER SCREEN RESULTS DOCUMENT/REVIEW [3017F]     Order Specific Question:   Case Referring Provider     Answer:   ANDRE SHRESTHA [[id number]]     Order Specific Question:   CPT Code:     Answer:   ME COLORECTAL SCRN; HI RISK IND []     Order Specific Question:   Post-Procedure Disposition:     Answer:   Amb Surgery/DOSC [2]     Order Specific Question:   Medical Necessity:     Answer:   Medically Non-Urgent [100]        Andre Shrestha MD

## 2020-02-28 NOTE — ASSESSMENT & PLAN NOTE
I recommend statin to reduce LDL below 70 with diabetes.  Vascepa is needed for reduction in triglycerides. Discussed hyperlipidemia disease course, healthy diet and increased need for exercise.  Discussed the risk of cardiovascular disease, increase stroke and heart attack risk.    Patient voiced understanding and understood the treatment plan. All questions were answered.

## 2020-02-28 NOTE — ASSESSMENT & PLAN NOTE
I recommend anti-inflammatories and physical therapy.  Referral to Orthopedics for further evaluation.

## 2020-02-28 NOTE — ASSESSMENT & PLAN NOTE
Patient would like to focus on his other chronic conditions 1st.  Patient reports that he defers any medication at this time.  He will consider counseling.Discussed depression condition course.  Discussed SSRI as first-line treatment for this condition.  Discussed risk of discontinuing this medication without tapering.  Patient was educated, advised of side effects, and all questions were answered.  Patient voiced understanding.  Patient will follow up routinely and notify us if having any side effects or worsening or persistent symptoms.  ER precautions were given.

## 2020-03-04 DIAGNOSIS — M17.0 PRIMARY OSTEOARTHRITIS OF BOTH KNEES: Primary | ICD-10-CM

## 2020-03-25 ENCOUNTER — TELEPHONE (OUTPATIENT)
Dept: UROLOGY | Facility: CLINIC | Age: 50
End: 2020-03-25

## 2020-03-25 NOTE — TELEPHONE ENCOUNTER
----- Message from Marilou Sen sent at 3/25/2020  1:13 PM CDT -----  Contact: Wife Donna  Type:  Patient Returning Call    Who Called:  Donna  Who Left Message for Patient:  Not sure  Does the patient know what this is regarding?:  Not sure but has appt on Monday  Best Call Back Number:  [phone] (home)   Additional Information:  na

## 2020-03-26 ENCOUNTER — TELEPHONE (OUTPATIENT)
Dept: NEPHROLOGY | Facility: CLINIC | Age: 50
End: 2020-03-26

## 2020-03-26 ENCOUNTER — TELEPHONE (OUTPATIENT)
Dept: FAMILY MEDICINE | Facility: CLINIC | Age: 50
End: 2020-03-26

## 2020-03-26 NOTE — TELEPHONE ENCOUNTER
Attempted to call patient regarding video visit for today, left message asking patient to call clinic back as it was noticed that the patient had not logged onto the visit.

## 2020-06-01 PROBLEM — Z00.01 ENCOUNTER FOR GENERAL ADULT MEDICAL EXAMINATION WITH ABNORMAL FINDINGS: Status: RESOLVED | Noted: 2020-02-27 | Resolved: 2020-06-01

## 2020-06-01 PROBLEM — Z13.6 ENCOUNTER FOR LIPID SCREENING FOR CARDIOVASCULAR DISEASE: Status: RESOLVED | Noted: 2020-02-27 | Resolved: 2020-06-01

## 2020-06-01 PROBLEM — Z13.220 ENCOUNTER FOR LIPID SCREENING FOR CARDIOVASCULAR DISEASE: Status: RESOLVED | Noted: 2020-02-27 | Resolved: 2020-06-01

## 2020-10-06 ENCOUNTER — PATIENT MESSAGE (OUTPATIENT)
Dept: ADMINISTRATIVE | Facility: HOSPITAL | Age: 50
End: 2020-10-06

## 2020-10-29 NOTE — PROGRESS NOTES
PLAN:      Problem List Items Addressed This Visit     Type 2 diabetes mellitus with diabetic polyneuropathy, without long-term current use of insulin - Primary (Chronic)     Patient sent to ER for evaluation.  Patient states that he will go by personal vehicle , deferred ambulance.  Patient has his wife who will drive him to the closest emergency.    Strongly advise patient of his need to start insulin as soon as possible.  Patient is having nausea vomiting.  Will send for Zofran and promethazine.    Restart diabetic medications as soon as possible very close follow-up in two weeks.  Prescription for glucometer and supplies was given to the patient home paper.    Patient is at high risk for DKA and other complications of diabetes if not getting the proper evaluation and starting his medications.  All questions were answered.  Patient understands and voices that he will go to the emergency department.    We will plan to monitor hemoglobin A1c at designated intervals 3 to 6 months.  I recommend ongoing Education for diabetic diet and exercise protocol.  We will continue to monitor for side effects.    Please be advised of symptoms to monitor for and to notify me immediately if persistent or worsening.  Follow up with Ophthalmology/Optometry and Podiatry at least annually.           Relevant Medications    empagliflozin (JARDIANCE) 25 mg tablet    glipiZIDE (GLUCOTROL) 5 MG tablet    semaglutide (OZEMPIC) 0.25 mg or 0.5 mg(2 mg/1.5 mL) PnIj    insulin (LANTUS SOLOSTAR U-100 INSULIN) glargine 100 units/mL (3mL) SubQ pen    Other Relevant Orders    Hemoglobin A1C    Comprehensive Metabolic Panel    TSH    POCT Glucose, Hand-Held Device    URINALYSIS    Urinalysis Microscopic    Hyperlipidemia LDL goal <130     Noncompliant with medication.  Restart atorvastatin.  Check fasting lipid panel wants fasting.  Will check of lower today to assess kidney function etc..    Counseled on hyperlipidemia disease course, healthy diet  and increased need for exercise.  Please be advised of the risk of cardiovascular disease, increase stroke and heart attack risk with uncontrolled/untreated hyperlipidemia.     Patient voiced understanding and understood the treatment plan. All questions were answered.              Relevant Medications    atorvastatin (LIPITOR) 20 MG tablet    icosapent ethyL (VASCEPA) 1 gram Cap    Other Relevant Orders    Hemoglobin A1C    Comprehensive Metabolic Panel    TSH    Encounter for long-term (current) use of medications     Patient with multiple lab abnormalities.  Patient denies starting if his medications that were prescribed in February 2020.  Patient is just now following up from abnormal lab results which were called and mailed to the patient.  Complete history and physical was completed today.  Complete and thorough medication reconciliation was performed.  Discussed risks and benefits of medications.  Advised patient on orders and health maintenance.  We discussed old records and old labs if available.  Will request any records not available through epic.  Continue current medications listed on your summary sheet.           Relevant Medications    empagliflozin (JARDIANCE) 25 mg tablet    glipiZIDE (GLUCOTROL) 5 MG tablet    levothyroxine (SYNTHROID) 25 MCG tablet    atorvastatin (LIPITOR) 20 MG tablet    icosapent ethyL (VASCEPA) 1 gram Cap    Other Relevant Orders    Hemoglobin A1C    Comprehensive Metabolic Panel    TSH    Hypothyroidism     Start levothyroxine 25 mcg.  Recheck TSH in two months.  Please be advised of hypothyroidism disease course.  We will plan to monitor thyroid labs at routine intervals.  Please be advised of risks and benefits of medication use, see medication insert for complete details.  ER precautions.           Relevant Medications    levothyroxine (SYNTHROID) 25 MCG tablet    Other Relevant Orders    Hemoglobin A1C    Comprehensive Metabolic Panel    TSH    Referral of patient     Relevant Orders    Ambulatory referral/consult to Pain Clinic    Nausea    Relevant Medications    ondansetron (ZOFRAN-ODT) 8 MG TbDL    promethazine (PHENERGAN) 25 MG tablet      Other Visit Diagnoses     Encounter for prostate cancer screening        Relevant Orders    PSA, Screening    Need for hepatitis C screening test        Relevant Orders    Hepatitis C Antibody        Future Appointments     Date Provider Specialty Appt Notes    10/30/2020  Lab     11/23/2020 Donnie Shrestha MD [facility] 4 wk fu           Medication List with Changes/Refills   New Medications    INSULIN (LANTUS SOLOSTAR U-100 INSULIN) GLARGINE 100 UNITS/ML (3ML) SUBQ PEN    Inject 20 Units into the skin every evening.    ONDANSETRON (ZOFRAN-ODT) 8 MG TBDL    Take 1 tablet (8 mg total) by mouth every 6 (six) hours as needed.    PROMETHAZINE (PHENERGAN) 25 MG TABLET    Take 1 tablet (25 mg total) by mouth every 6 (six) hours as needed for Nausea.    SEMAGLUTIDE (OZEMPIC) 0.25 MG OR 0.5 MG(2 MG/1.5 ML) PNIJ    Inject 0.25 mg into the skin once a week for 30 days, THEN 0.5 mg once a week.   Current Medications    ASPIRIN (ECOTRIN) 81 MG EC TABLET    Take 1 tablet (81 mg total) by mouth once daily.    LORATADINE (CLARITIN) 10 MG TABLET    Take 10 mg by mouth.   Changed and/or Refilled Medications    Modified Medication Previous Medication    ATORVASTATIN (LIPITOR) 20 MG TABLET atorvastatin (LIPITOR) 20 MG tablet       Take 1 tablet (20 mg total) by mouth once daily.    Take 1 tablet (20 mg total) by mouth once daily.    EMPAGLIFLOZIN (JARDIANCE) 25 MG TABLET empagliflozin (JARDIANCE) 25 mg Tab       Take 1 tablet (25 mg total) by mouth once daily.    Take 25 mg by mouth once daily.    GLIPIZIDE (GLUCOTROL) 5 MG TABLET glipiZIDE (GLUCOTROL) 5 MG tablet       Take 1 tablet (5 mg total) by mouth 2 (two) times daily with meals.    Take 1 tablet (5 mg total) by mouth 2 (two) times daily with meals.    ICOSAPENT ETHYL (VASCEPA) 1 GRAM CAP  icosapent ethyl (VASCEPA) 1 gram Cap       Take 2 g by mouth 2 (two) times daily.    Take 2 g by mouth 2 (two) times daily.    LEVOTHYROXINE (SYNTHROID) 25 MCG TABLET levothyroxine (SYNTHROID) 25 MCG tablet       Take 1 tablet (25 mcg total) by mouth once daily. Repeat a TSH in my office in 2 months.    Take 1 tablet (25 mcg total) by mouth once daily. Repeat a TSH in my office in 2 months.       Donnie Shrestha M.D.     ==========================================================================  Subjective:      Patient ID: Rashaun Mccormick is a 49 y.o. male.  has a past medical history of Anxiety, Arthritis, Cholelithiasis, Chronic pain, Chronic pain of both knees, Diabetes mellitus, type 2, Hyperlipidemia, and Nephrolithiasis.     Chief Complaint: Follow-up and Diabetes      Problem List Items Addressed This Visit     Type 2 diabetes mellitus with diabetic polyneuropathy, without long-term current use of insulin - Primary (Chronic)    Overview     ==================================================  OCTOBER 2020:  Patient lost to follow-up.  Patient reports that he was noncompliant with his medications and actually never picked them up in February when they were prescribed.  Patient presents today with increased nausea and vomiting and feeling very fatigued.  Point of care blood sugar is 438.    Diabetes Management Status    Statin: Taking  ACE/ARB: Not taking    Screening or Prevention Patient's value Goal Complete/Controlled?   HgA1C Testing and Control   Lab Results   Component Value Date    HGBA1C 10.8 (H) 02/27/2020      Annually/Less than 8% No   Lipid profile : 02/27/2020 Annually Yes   LDL control Lab Results   Component Value Date    LDLCALC 123.4 02/27/2020    Annually/Less than 100 mg/dl  No   Nephropathy screening Lab Results   Component Value Date    LABMICR 106.0 02/27/2020     Lab Results   Component Value Date    PROTEINUA Trace (A) 02/27/2020    Annually Yes   Blood pressure BP Readings from Last 1  Encounters:   10/30/20 133/88    Less than 140/90 Yes   Dilated retinal exam Most Recent Eye Exam Date: Not Found Annually No   Foot exam   : 02/27/2020 Annually Yes       =================================================  February 2020:  Chronic.  Uncontrolled.  Patient states that he has been on insulin and other medications in the past.  Specifically metformin gave him severe diarrhea at different doses and he cannot take this medication.  Patient is currently on glipizide but reports intermittent compliance.  Patient did have a recent illness that made his blood sugars go up.  No recent steroids.           Current Assessment & Plan     Patient sent to ER for evaluation.  Patient states that he will go by personal vehicle , deferred ambulance.  Patient has his wife who will drive him to the closest emergency.    Strongly advise patient of his need to start insulin as soon as possible.  Patient is having nausea vomiting.  Will send for Zofran and promethazine.    Restart diabetic medications as soon as possible very close follow-up in two weeks.  Prescription for glucometer and supplies was given to the patient home paper.    Patient is at high risk for DKA and other complications of diabetes if not getting the proper evaluation and starting his medications.  All questions were answered.  Patient understands and voices that he will go to the emergency department.    We will plan to monitor hemoglobin A1c at designated intervals 3 to 6 months.  I recommend ongoing Education for diabetic diet and exercise protocol.  We will continue to monitor for side effects.    Please be advised of symptoms to monitor for and to notify me immediately if persistent or worsening.  Follow up with Ophthalmology/Optometry and Podiatry at least annually.           Hyperlipidemia LDL goal <130    Overview     CHRONIC.  Uncontrolled. Lab analysis reviewed.   October 2020:  Patient reports that he has been noncompliant with medication.   He is not fasting this morning for  February 2020:  Patient is on no medications currently.  Patient reports eating a very well controlled diet.    Lab Results   Component Value Date    CHOL 212 (H) 02/27/2020     Lab Results   Component Value Date    HDL 37 (L) 02/27/2020     Lab Results   Component Value Date    LDLCALC 123.4 02/27/2020     Lab Results   Component Value Date    TRIG 258 (H) 02/27/2020     Lab Results   Component Value Date    CHOLHDL 17.5 (L) 02/27/2020     Lab Results   Component Value Date    TOTALCHOLEST 5.7 (H) 02/27/2020     Lab Results   Component Value Date    ALT 23 02/27/2020    AST 21 02/27/2020    ALKPHOS 67 02/27/2020    BILITOT 0.3 02/27/2020     ======================================================           Current Assessment & Plan     Noncompliant with medication.  Restart atorvastatin.  Check fasting lipid panel wants fasting.  Will check of lower today to assess kidney function etc..    Counseled on hyperlipidemia disease course, healthy diet and increased need for exercise.  Please be advised of the risk of cardiovascular disease, increase stroke and heart attack risk with uncontrolled/untreated hyperlipidemia.     Patient voiced understanding and understood the treatment plan. All questions were answered.              Encounter for long-term (current) use of medications    Overview     February 2020:  CHRONIC. Stable. Compliant with medications for managed conditions. See medication list. No SE reported.   Routine lab analysis is being monitored. Refills were addressed.  ==================================================  OCTOBER 2020:    CHRONIC. Stable. Compliant with medications for managed conditions. See medication list. No SE reported.   Routine lab analysis is being monitored. Refills were addressed.  Lab Results   Component Value Date    WBC 8.79 02/27/2020    HGB 16.6 02/27/2020    HCT 50.1 02/27/2020    MCV 93 02/27/2020     02/27/2020         Chemistry         Component Value Date/Time     02/27/2020 0828    K 4.5 02/27/2020 0828     02/27/2020 0828    CO2 20 (L) 02/27/2020 0828    BUN 11 02/27/2020 0828    CREATININE 0.9 02/27/2020 0828     (H) 02/27/2020 0828        Component Value Date/Time    CALCIUM 9.5 02/27/2020 0828    ALKPHOS 67 02/27/2020 0828    AST 21 02/27/2020 0828    ALT 23 02/27/2020 0828    BILITOT 0.3 02/27/2020 0828    ESTGFRAFRICA >60.0 02/27/2020 0828    EGFRNONAA >60.0 02/27/2020 0828          Lab Results   Component Value Date    TSH 16.042 (H) 02/27/2020    FREET4 0.71 02/27/2020       =================================================         Current Assessment & Plan     Patient with multiple lab abnormalities.  Patient denies starting if his medications that were prescribed in February 2020.  Patient is just now following up from abnormal lab results which were called and mailed to the patient.  Complete history and physical was completed today.  Complete and thorough medication reconciliation was performed.  Discussed risks and benefits of medications.  Advised patient on orders and health maintenance.  We discussed old records and old labs if available.  Will request any records not available through epic.  Continue current medications listed on your summary sheet.           Hypothyroidism    Overview     Lab Results   Component Value Date    TSH 16.042 (H) 02/27/2020    FREET4 0.71 02/27/2020 October 2020:  Patient did not  the levothyroxine and has not taken this medication.         Current Assessment & Plan     Start levothyroxine 25 mcg.  Recheck TSH in two months.  Please be advised of hypothyroidism disease course.  We will plan to monitor thyroid labs at routine intervals.  Please be advised of risks and benefits of medication use, see medication insert for complete details.  ER precautions.           Referral of patient    Nausea    Overview     See diabetes.           Other Visit Diagnoses     Encounter  for prostate cancer screening        Need for hepatitis C screening test               Past Medical History:  Past Medical History:   Diagnosis Date    Anxiety     Arthritis     Cholelithiasis     Chronic pain     Chronic pain of both knees     Diabetes mellitus, type 2     Hyperlipidemia     Nephrolithiasis      Past Surgical History:   Procedure Laterality Date    CHOLECYSTECTOMY      COLON SURGERY      KIDNEY SURGERY       Review of patient's allergies indicates:   Allergen Reactions    Metformin Diarrhea    Gabapentin Other (See Comments)     Shoulder pain    Codeine      Unknown^  Unknown^      Penicillins Other (See Comments)     Anaphylaxis^  Anaphylaxis^  Never had a reaction, father had a bad reaction       Medication List with Changes/Refills   New Medications    INSULIN (LANTUS SOLOSTAR U-100 INSULIN) GLARGINE 100 UNITS/ML (3ML) SUBQ PEN    Inject 20 Units into the skin every evening.    ONDANSETRON (ZOFRAN-ODT) 8 MG TBDL    Take 1 tablet (8 mg total) by mouth every 6 (six) hours as needed.    PROMETHAZINE (PHENERGAN) 25 MG TABLET    Take 1 tablet (25 mg total) by mouth every 6 (six) hours as needed for Nausea.    SEMAGLUTIDE (OZEMPIC) 0.25 MG OR 0.5 MG(2 MG/1.5 ML) PNIJ    Inject 0.25 mg into the skin once a week for 30 days, THEN 0.5 mg once a week.   Current Medications    ASPIRIN (ECOTRIN) 81 MG EC TABLET    Take 1 tablet (81 mg total) by mouth once daily.    LORATADINE (CLARITIN) 10 MG TABLET    Take 10 mg by mouth.   Changed and/or Refilled Medications    Modified Medication Previous Medication    ATORVASTATIN (LIPITOR) 20 MG TABLET atorvastatin (LIPITOR) 20 MG tablet       Take 1 tablet (20 mg total) by mouth once daily.    Take 1 tablet (20 mg total) by mouth once daily.    EMPAGLIFLOZIN (JARDIANCE) 25 MG TABLET empagliflozin (JARDIANCE) 25 mg Tab       Take 1 tablet (25 mg total) by mouth once daily.    Take 25 mg by mouth once daily.    GLIPIZIDE (GLUCOTROL) 5 MG TABLET glipiZIDE  "(GLUCOTROL) 5 MG tablet       Take 1 tablet (5 mg total) by mouth 2 (two) times daily with meals.    Take 1 tablet (5 mg total) by mouth 2 (two) times daily with meals.    ICOSAPENT ETHYL (VASCEPA) 1 GRAM CAP icosapent ethyl (VASCEPA) 1 gram Cap       Take 2 g by mouth 2 (two) times daily.    Take 2 g by mouth 2 (two) times daily.    LEVOTHYROXINE (SYNTHROID) 25 MCG TABLET levothyroxine (SYNTHROID) 25 MCG tablet       Take 1 tablet (25 mcg total) by mouth once daily. Repeat a TSH in my office in 2 months.    Take 1 tablet (25 mcg total) by mouth once daily. Repeat a TSH in my office in 2 months.      Social History     Tobacco Use    Smoking status: Never Smoker    Smokeless tobacco: Never Used   Substance Use Topics    Alcohol use: Never     Frequency: Never      Family History   Problem Relation Age of Onset    Cancer Mother     COPD Father        I have reviewed the complete PMH, social history, surgical history, allergies and medications.  As well as family history.    Review of Systems   Constitutional: Positive for fatigue. Negative for activity change.   HENT: Negative for congestion and sinus pain.    Eyes: Negative for visual disturbance.   Respiratory: Positive for shortness of breath. Negative for chest tightness.    Cardiovascular: Positive for palpitations. Negative for leg swelling.   Gastrointestinal: Positive for nausea and vomiting. Negative for abdominal pain and diarrhea.   Endocrine: Negative for polyuria.   Genitourinary: Negative for difficulty urinating and frequency.   Musculoskeletal: Positive for arthralgias, joint swelling and myalgias.   Skin: Negative for rash.   Neurological: Negative for dizziness and headaches.   Psychiatric/Behavioral: Negative for agitation. The patient is not nervous/anxious.      Objective:   /88   Pulse (!) 113   Temp 97.3 °F (36.3 °C) (Temporal)   Ht 5' 11" (1.803 m)   Wt 86.6 kg (191 lb)   BMI 26.64 kg/m²   Physical Exam  Vitals signs and " nursing note reviewed.   Constitutional:       General: He is not in acute distress.     Appearance: He is well-developed. He is ill-appearing.   HENT:      Head: Normocephalic and atraumatic.      Right Ear: External ear normal.      Left Ear: External ear normal.      Nose: Nose normal. No rhinorrhea.   Eyes:      Extraocular Movements: Extraocular movements intact.      Pupils: Pupils are equal, round, and reactive to light.   Neck:      Musculoskeletal: Normal range of motion and neck supple.   Cardiovascular:      Rate and Rhythm: Tachycardia present.      Pulses: Normal pulses.   Pulmonary:      Effort: Pulmonary effort is normal. No respiratory distress.      Breath sounds: Normal breath sounds.   Abdominal:      General: Bowel sounds are normal. There is no distension.      Palpations: Abdomen is soft.      Tenderness: There is abdominal tenderness.   Musculoskeletal: Normal range of motion.   Skin:     General: Skin is warm and dry.      Capillary Refill: Capillary refill takes less than 2 seconds.   Neurological:      General: No focal deficit present.      Mental Status: He is alert and oriented to person, place, and time.   Psychiatric:         Mood and Affect: Mood normal.         Behavior: Behavior normal.         Assessment:     1. Type 2 diabetes mellitus with diabetic polyneuropathy, without long-term current use of insulin    2. Hypothyroidism, unspecified type    3. Hyperlipidemia LDL goal <130    4. Encounter for long-term (current) use of medications    5. Encounter for prostate cancer screening    6. Need for hepatitis C screening test    7. Nausea    8. Referral of patient      MDM:   High medical complexity.  High risk.  I have Reviewed and summarized old records.  I have performed thorough medication reconciliation today and discussed risk and benefits of each medication.  I have reviewed labs and discussed with patient.  All questions were answered.  I am requesting old records and will  review them once they are available.    I have signed for the following orders AND/OR meds.  Orders Placed This Encounter   Procedures    Hemoglobin A1C     Standing Status:   Standing     Number of Occurrences:   99     Standing Expiration Date:   12/30/2021    Comprehensive Metabolic Panel     Standing Status:   Standing     Number of Occurrences:   99     Standing Expiration Date:   12/29/2021    TSH     Standing Status:   Standing     Number of Occurrences:   99     Standing Expiration Date:   12/30/2021    Hepatitis C Antibody     Standing Status:   Future     Standing Expiration Date:   12/30/2021    PSA, Screening     Standing Status:   Future     Standing Expiration Date:   12/29/2021    URINALYSIS     Specimen Source->Urine     Order Specific Question:   Collection Type     Answer:   Urine, Clean Catch    Urinalysis Microscopic     Specimen Source->Urine     Order Specific Question:   Specimen Source     Answer:   Urine    Ambulatory referral/consult to Pain Clinic     Standing Status:   Future     Standing Expiration Date:   11/30/2021     Referral Priority:   Routine     Referral Type:   Consultation     Referral Reason:   Specialty Services Required     Referred to Provider:   [facility]     Requested Specialty:   Pain Medicine     Number of Visits Requested:   1    POCT Glucose, Hand-Held Device     Medications Ordered This Encounter   Medications    atorvastatin (LIPITOR) 20 MG tablet     Sig: Take 1 tablet (20 mg total) by mouth once daily.     Dispense:  90 tablet     Refill:  4    empagliflozin (JARDIANCE) 25 mg tablet     Sig: Take 1 tablet (25 mg total) by mouth once daily.     Dispense:  90 tablet     Refill:  4    glipiZIDE (GLUCOTROL) 5 MG tablet     Sig: Take 1 tablet (5 mg total) by mouth 2 (two) times daily with meals.     Dispense:  180 tablet     Refill:  4    icosapent ethyL (VASCEPA) 1 gram Cap     Sig: Take 2 g by mouth 2 (two) times daily.     Dispense:  120  capsule     Refill:  4    insulin (LANTUS SOLOSTAR U-100 INSULIN) glargine 100 units/mL (3mL) SubQ pen     Sig: Inject 20 Units into the skin every evening.     Dispense:  15 mL     Refill:  11    levothyroxine (SYNTHROID) 25 MCG tablet     Sig: Take 1 tablet (25 mcg total) by mouth once daily. Repeat a TSH in my office in 2 months.     Dispense:  90 tablet     Refill:  4    ondansetron (ZOFRAN-ODT) 8 MG TbDL     Sig: Take 1 tablet (8 mg total) by mouth every 6 (six) hours as needed.     Dispense:  30 tablet     Refill:  0    promethazine (PHENERGAN) 25 MG tablet     Sig: Take 1 tablet (25 mg total) by mouth every 6 (six) hours as needed for Nausea.     Dispense:  12 tablet     Refill:  0    semaglutide (OZEMPIC) 0.25 mg or 0.5 mg(2 mg/1.5 mL) PnIj     Sig: Inject 0.25 mg into the skin once a week for 30 days, THEN 0.5 mg once a week.     Dispense:  1 Syringe     Refill:  1        Follow up in about 2 weeks (around 11/13/2020), or if symptoms worsen or fail to improve, for Med refills, LAB RESULTS.    If no improvement in symptoms or symptoms worsen, advised to call/follow-up at clinic or go to ER. Patient voiced understanding and all questions/concerns were addressed.     DISCLAIMER: This note was compiled by using a speech recognition dictation system and therefore please be aware that typographical / speech recognition errors can and do occur.  Please contact me if you see any errors specifically.    Donnie Shrestha M.D.       Office: [phone]   [address] [place] [address]  FAX: [phone]

## 2020-10-30 ENCOUNTER — OFFICE VISIT (OUTPATIENT)
Dept: FAMILY MEDICINE | Facility: CLINIC | Age: 50
End: 2020-10-30
Payer: MEDICARE

## 2020-10-30 ENCOUNTER — LAB VISIT (OUTPATIENT)
Dept: LAB | Facility: HOSPITAL | Age: 50
End: 2020-10-30
Attending: FAMILY MEDICINE
Payer: MEDICARE

## 2020-10-30 VITALS
HEIGHT: 71 IN | BODY MASS INDEX: 26.74 KG/M2 | WEIGHT: 191 LBS | HEART RATE: 113 BPM | TEMPERATURE: 97 F | DIASTOLIC BLOOD PRESSURE: 88 MMHG | SYSTOLIC BLOOD PRESSURE: 133 MMHG

## 2020-10-30 DIAGNOSIS — E03.9 HYPOTHYROIDISM, UNSPECIFIED TYPE: ICD-10-CM

## 2020-10-30 DIAGNOSIS — Z11.59 NEED FOR HEPATITIS C SCREENING TEST: ICD-10-CM

## 2020-10-30 DIAGNOSIS — Z76.89 REFERRAL OF PATIENT: ICD-10-CM

## 2020-10-30 DIAGNOSIS — Z79.899 ENCOUNTER FOR LONG-TERM (CURRENT) USE OF MEDICATIONS: ICD-10-CM

## 2020-10-30 DIAGNOSIS — E11.42 TYPE 2 DIABETES MELLITUS WITH DIABETIC POLYNEUROPATHY, WITHOUT LONG-TERM CURRENT USE OF INSULIN: Chronic | ICD-10-CM

## 2020-10-30 DIAGNOSIS — Z12.5 ENCOUNTER FOR PROSTATE CANCER SCREENING: ICD-10-CM

## 2020-10-30 DIAGNOSIS — E78.5 HYPERLIPIDEMIA LDL GOAL <130: ICD-10-CM

## 2020-10-30 DIAGNOSIS — R11.0 NAUSEA: ICD-10-CM

## 2020-10-30 DIAGNOSIS — E11.42 TYPE 2 DIABETES MELLITUS WITH DIABETIC POLYNEUROPATHY, WITHOUT LONG-TERM CURRENT USE OF INSULIN: Primary | Chronic | ICD-10-CM

## 2020-10-30 LAB
ALBUMIN SERPL BCP-MCNC: 4.5 G/DL (ref 3.5–5.2)
ALP SERPL-CCNC: 91 U/L (ref 55–135)
ALT SERPL W/O P-5'-P-CCNC: 19 U/L (ref 10–44)
ANION GAP SERPL CALC-SCNC: 12 MMOL/L (ref 8–16)
AST SERPL-CCNC: 15 U/L (ref 10–40)
BACTERIA #/AREA URNS AUTO: NORMAL /HPF
BILIRUB SERPL-MCNC: 0.6 MG/DL (ref 0.1–1)
BILIRUB UR QL STRIP: NEGATIVE
BUN SERPL-MCNC: 14 MG/DL (ref 6–20)
CALCIUM SERPL-MCNC: 9.9 MG/DL (ref 8.7–10.5)
CHLORIDE SERPL-SCNC: 102 MMOL/L (ref 95–110)
CLARITY UR REFRACT.AUTO: CLEAR
CO2 SERPL-SCNC: 22 MMOL/L (ref 23–29)
COLOR UR AUTO: YELLOW
COMPLEXED PSA SERPL-MCNC: 0.19 NG/ML (ref 0–4)
CREAT SERPL-MCNC: 1.1 MG/DL (ref 0.5–1.4)
EST. GFR  (AFRICAN AMERICAN): >60 ML/MIN/1.73 M^2
EST. GFR  (NON AFRICAN AMERICAN): >60 ML/MIN/1.73 M^2
ESTIMATED AVG GLUCOSE: 286 MG/DL (ref 68–131)
GLUCOSE SERPL-MCNC: 469 MG/DL (ref 70–110)
GLUCOSE UR QL STRIP: ABNORMAL
HBA1C MFR BLD HPLC: 11.6 % (ref 4–5.6)
HGB UR QL STRIP: NEGATIVE
KETONES UR QL STRIP: ABNORMAL
LEUKOCYTE ESTERASE UR QL STRIP: NEGATIVE
MICROSCOPIC COMMENT: NORMAL
NITRITE UR QL STRIP: NEGATIVE
PH UR STRIP: 5 [PH] (ref 5–8)
POTASSIUM SERPL-SCNC: 4.6 MMOL/L (ref 3.5–5.1)
PROT SERPL-MCNC: 7.8 G/DL (ref 6–8.4)
PROT UR QL STRIP: NEGATIVE
SODIUM SERPL-SCNC: 136 MMOL/L (ref 136–145)
SP GR UR STRIP: >=1.03 (ref 1–1.03)
T4 FREE SERPL-MCNC: 0.75 NG/DL (ref 0.71–1.51)
TSH SERPL DL<=0.005 MIU/L-ACNC: 10.01 UIU/ML (ref 0.4–4)
URN SPEC COLLECT METH UR: ABNORMAL
WBC #/AREA URNS AUTO: 1 /HPF (ref 0–5)
YEAST UR QL AUTO: NORMAL

## 2020-10-30 PROCEDURE — 86803 HEPATITIS C AB TEST: CPT | Mod: HCNC

## 2020-10-30 PROCEDURE — 99999 PR PBB SHADOW E&M-EST. PATIENT-LVL III: ICD-10-PCS | Mod: PBBFAC,HCNC,, | Performed by: FAMILY MEDICINE

## 2020-10-30 PROCEDURE — 84443 ASSAY THYROID STIM HORMONE: CPT | Mod: HCNC

## 2020-10-30 PROCEDURE — 3046F PR MOST RECENT HEMOGLOBIN A1C LEVEL > 9.0%: ICD-10-PCS | Mod: HCNC,CPTII,S$GLB, | Performed by: FAMILY MEDICINE

## 2020-10-30 PROCEDURE — 99999 PR PBB SHADOW E&M-EST. PATIENT-LVL III: CPT | Mod: PBBFAC,HCNC,, | Performed by: FAMILY MEDICINE

## 2020-10-30 PROCEDURE — 99215 OFFICE O/P EST HI 40 MIN: CPT | Mod: HCNC,S$GLB,, | Performed by: FAMILY MEDICINE

## 2020-10-30 PROCEDURE — 3008F PR BODY MASS INDEX (BMI) DOCUMENTED: ICD-10-PCS | Mod: HCNC,CPTII,S$GLB, | Performed by: FAMILY MEDICINE

## 2020-10-30 PROCEDURE — 36415 COLL VENOUS BLD VENIPUNCTURE: CPT | Mod: HCNC,PO

## 2020-10-30 PROCEDURE — 81001 URINALYSIS AUTO W/SCOPE: CPT | Mod: HCNC

## 2020-10-30 PROCEDURE — 99215 PR OFFICE/OUTPT VISIT, EST, LEVL V, 40-54 MIN: ICD-10-PCS | Mod: HCNC,S$GLB,, | Performed by: FAMILY MEDICINE

## 2020-10-30 PROCEDURE — 3046F HEMOGLOBIN A1C LEVEL >9.0%: CPT | Mod: HCNC,CPTII,S$GLB, | Performed by: FAMILY MEDICINE

## 2020-10-30 PROCEDURE — 83036 HEMOGLOBIN GLYCOSYLATED A1C: CPT | Mod: HCNC

## 2020-10-30 PROCEDURE — 84439 ASSAY OF FREE THYROXINE: CPT | Mod: HCNC

## 2020-10-30 PROCEDURE — 3008F BODY MASS INDEX DOCD: CPT | Mod: HCNC,CPTII,S$GLB, | Performed by: FAMILY MEDICINE

## 2020-10-30 PROCEDURE — 84153 ASSAY OF PSA TOTAL: CPT | Mod: HCNC

## 2020-10-30 PROCEDURE — 80053 COMPREHEN METABOLIC PANEL: CPT | Mod: HCNC

## 2020-10-30 RX ORDER — ONDANSETRON 8 MG/1
8 TABLET, ORALLY DISINTEGRATING ORAL EVERY 6 HOURS PRN
Qty: 30 TABLET | Refills: 0 | Status: SHIPPED | OUTPATIENT
Start: 2020-10-30 | End: 2020-11-23 | Stop reason: SDUPTHER

## 2020-10-30 RX ORDER — INSULIN GLARGINE 100 [IU]/ML
20 INJECTION, SOLUTION SUBCUTANEOUS NIGHTLY
Qty: 15 ML | Refills: 11 | Status: SHIPPED | OUTPATIENT
Start: 2020-10-30 | End: 2021-02-11 | Stop reason: ALTCHOICE

## 2020-10-30 RX ORDER — GLIPIZIDE 5 MG/1
5 TABLET ORAL 2 TIMES DAILY WITH MEALS
Qty: 180 TABLET | Refills: 4 | Status: SHIPPED | OUTPATIENT
Start: 2020-10-30 | End: 2021-12-03 | Stop reason: SDUPTHER

## 2020-10-30 RX ORDER — ICOSAPENT ETHYL 1000 MG/1
2 CAPSULE ORAL 2 TIMES DAILY
Qty: 120 CAPSULE | Refills: 4 | Status: SHIPPED | OUTPATIENT
Start: 2020-10-30 | End: 2022-02-17 | Stop reason: SDUPTHER

## 2020-10-30 RX ORDER — ATORVASTATIN CALCIUM 20 MG/1
20 TABLET, FILM COATED ORAL DAILY
Qty: 90 TABLET | Refills: 4 | Status: SHIPPED | OUTPATIENT
Start: 2020-10-30 | End: 2021-02-11 | Stop reason: SDUPTHER

## 2020-10-30 RX ORDER — EMPAGLIFLOZIN 25 MG/1
25 TABLET, FILM COATED ORAL DAILY
Qty: 90 TABLET | Refills: 4 | Status: SHIPPED | OUTPATIENT
Start: 2020-10-30 | End: 2021-12-03 | Stop reason: SDUPTHER

## 2020-10-30 RX ORDER — PROMETHAZINE HYDROCHLORIDE 25 MG/1
25 TABLET ORAL EVERY 6 HOURS PRN
Qty: 12 TABLET | Refills: 0 | Status: SHIPPED | OUTPATIENT
Start: 2020-10-30 | End: 2020-11-06

## 2020-10-30 RX ORDER — SEMAGLUTIDE 1.34 MG/ML
INJECTION, SOLUTION SUBCUTANEOUS
Qty: 1 SYRINGE | Refills: 1 | Status: SHIPPED | OUTPATIENT
Start: 2020-10-30 | End: 2020-12-29

## 2020-10-30 RX ORDER — LEVOTHYROXINE SODIUM 25 UG/1
25 TABLET ORAL DAILY
Qty: 90 TABLET | Refills: 4 | Status: SHIPPED | OUTPATIENT
Start: 2020-10-30 | End: 2021-12-03 | Stop reason: SDUPTHER

## 2020-10-30 NOTE — ASSESSMENT & PLAN NOTE
Noncompliant with medication.  Restart atorvastatin.  Check fasting lipid panel wants fasting.  Will check of lower today to assess kidney function etc..    Counseled on hyperlipidemia disease course, healthy diet and increased need for exercise.  Please be advised of the risk of cardiovascular disease, increase stroke and heart attack risk with uncontrolled/untreated hyperlipidemia.     Patient voiced understanding and understood the treatment plan. All questions were answered.

## 2020-10-30 NOTE — ASSESSMENT & PLAN NOTE
Patient with multiple lab abnormalities.  Patient denies starting if his medications that were prescribed in February 2020.  Patient is just now following up from abnormal lab results which were called and mailed to the patient.  Complete history and physical was completed today.  Complete and thorough medication reconciliation was performed.  Discussed risks and benefits of medications.  Advised patient on orders and health maintenance.  We discussed old records and old labs if available.  Will request any records not available through epic.  Continue current medications listed on your summary sheet.

## 2020-10-30 NOTE — PATIENT INSTRUCTIONS
Follow up in about 2 weeks (around 11/13/2020), or if symptoms worsen or fail to improve, for Med refills, LAB RESULTS.     GO TO ER NOW FOR FURTHER EVALUATION.  YOUR BLOOD SUGAR IS TOO HIGH IN YOUR NOT TOLERATING LIQUIDS WITHOUT NAUSEA VOMITING.     If no improvement in symptoms or symptoms worsen, please be advised to call MD, follow-up at clinic and/or go to ER if becomes severe.    Donnie Shrestha M.D.        We Offer TELEHEALTH & Same Day Appointments!   Book your Telehealth appointment with me through my nurse or   Clinic appointments on [employer]!    [address] [place] [address]    Office: [phone]   FAX: [phone]    Check out my Facebook Page and Follow Me at: [email or web address]    Check out my website at [employer] by clicking on: [email or web address]    To Schedule appointments online, go to [facility]: [email or web address]

## 2020-10-30 NOTE — ASSESSMENT & PLAN NOTE
Patient sent to ER for evaluation.  Patient states that he will go by personal vehicle , deferred ambulance.  Patient has his wife who will drive him to the closest emergency.    Strongly advise patient of his need to start insulin as soon as possible.  Patient is having nausea vomiting.  Will send for Zofran and promethazine.    Restart diabetic medications as soon as possible very close follow-up in two weeks.  Prescription for glucometer and supplies was given to the patient home paper.    Patient is at high risk for DKA and other complications of diabetes if not getting the proper evaluation and starting his medications.  All questions were answered.  Patient understands and voices that he will go to the emergency department.    We will plan to monitor hemoglobin A1c at designated intervals 3 to 6 months.  I recommend ongoing Education for diabetic diet and exercise protocol.  We will continue to monitor for side effects.    Please be advised of symptoms to monitor for and to notify me immediately if persistent or worsening.  Follow up with Ophthalmology/Optometry and Podiatry at least annually.

## 2020-10-30 NOTE — ASSESSMENT & PLAN NOTE
Start levothyroxine 25 mcg.  Recheck TSH in two months.  Please be advised of hypothyroidism disease course.  We will plan to monitor thyroid labs at routine intervals.  Please be advised of risks and benefits of medication use, see medication insert for complete details.  ER precautions.

## 2020-10-30 NOTE — PROGRESS NOTES
Patient has been advised to go to the emergency room for evaluation, patient was offered ambulance transport and adamantly refused ambulance transfer.  Patient states that he will call his wife to come to the clinic to get him and he will have her take him to the ER in their private vehicle.

## 2020-11-02 LAB — HCV AB SERPL QL IA: NEGATIVE

## 2020-11-02 NOTE — PROGRESS NOTES
#CALL THE PATIENT# to discuss results/see if they have questions and document verification. Make FU appt if needed.    #Pend me the orders in my interpretation below.#    I have sent a message to them with the interpretation (see below).  See if they have any questions and make a follow-up appointment if not already schedule and if needed.    Also please address any outstanding health maintenance that may be due: Hepatitis C Screening due on 1969  Eye Exam due on 12/18/1979  HIV Screening due on 12/18/1984  TETANUS VACCINE due on 12/18/1987  Pneumococcal Vaccine (Medium Risk)(1 of 1 - PPSV23) due on 12/18/1988  Shingles Vaccine(1 of 2) due on 12/18/2019  Colorectal Cancer Screening due on 12/18/2019  Influenza Vaccine(1) due on 08/01/2020  ====================================    My interpretation that was sent to them through [employer]:    Rashaun, I have reviewed your recent blood work.     YOUR BLOOD SUGAR IS EXTREMELY HIGH AS IT WAS IN CLINIC.  I DO NOT SEE WHERE YOU REPORTED TO THE ER FOR EVALUATION.  I STILL RECOMMEND THAT YOU GO TO THE EMERGENCY DEPARTMENT AS SOON AS POSSIBLE FOR TREATMENT TO GET YOUR BLOOD SUGAR DOWN.  ALSO RECOMMEND TAKING YOUR MEDICATIONS AS PRESCRIBED AT PREVIOUS OFFICE VISIT PLEASE FOLLOW-UP CLOSELY.  Your metabolic panel which shows your glucose, kidney function, electrolytes, and liver function is  OTHERWISE WITHIN NORMAL LIMITS EXCEPT FOR ELEVATED GLUCOSE.  SEE ABOVE..   Thyroid studies are ABNORMAL.  PLEASE START THYROID MEDICATION AS PRESCRIBED AND FOLLOW-UP IN TWO MONTHS.  Your PSA SCREENING IS WITHIN NORMAL LIMITS   Your hemoglobin A1c is 11.6 WHICH IS WORSE THAN PREVIOUS EIGHT MONTHS AGO.  CLOSE DIABETES FOLLOW-UP IS RECOMMENDED TO MAKE SURE THAT WE ARE WORKING TO LOWER THIS LEVEL..  This test is gold standard screening test for diabetes.  It is a measures 3 months of your average blood sugar.    REPEAT A1C EVERY THREE MONTHS.

## 2020-11-04 ENCOUNTER — TELEPHONE (OUTPATIENT)
Dept: FAMILY MEDICINE | Facility: CLINIC | Age: 50
End: 2020-11-04

## 2020-11-04 NOTE — TELEPHONE ENCOUNTER
Called and spoke with patient/wife, notified of results and recommendations of Dr. Shrestha.  Wife reports that patient has been taking his medication and checking his sugar frequently with results now around the 90's.  Advised patient/wife to keep a log of glucose reading and bring with them to upcoming appointment on 11/23/2020 as patient states that he has managed his sugar without going to the er.  Patient /wife verbalized understanding of results.

## 2020-11-04 NOTE — TELEPHONE ENCOUNTER
----- Message from Donnie Shrestha MD sent at 11/1/2020  9:35 PM CST -----  #CALL THE PATIENT# to discuss results/see if they have questions and document verification. Make FU appt if needed.    #Pend me the orders in my interpretation below.#    I have sent a message to them with the interpretation (see below).  See if they have any questions and make a follow-up appointment if not already schedule and if needed.    Also please address any outstanding health maintenance that may be due: Hepatitis C Screening due on 1969  Eye Exam due on 12/18/1979  HIV Screening due on 12/18/1984  TETANUS VACCINE due on 12/18/1987  Pneumococcal Vaccine (Medium Risk)(1 of 1 - PPSV23) due on 12/18/1988  Shingles Vaccine(1 of 2) due on 12/18/2019  Colorectal Cancer Screening due on 12/18/2019  Influenza Vaccine(1) due on 08/01/2020  ====================================    My interpretation that was sent to them through [employer]:    Rashaun, I have reviewed your recent blood work.     YOUR BLOOD SUGAR IS EXTREMELY HIGH AS IT WAS IN CLINIC.  I DO NOT SEE WHERE YOU REPORTED TO THE ER FOR EVALUATION.  I STILL RECOMMEND THAT YOU GO TO THE EMERGENCY DEPARTMENT AS SOON AS POSSIBLE FOR TREATMENT TO GET YOUR BLOOD SUGAR DOWN.  ALSO RECOMMEND TAKING YOUR MEDICATIONS AS PRESCRIBED AT PREVIOUS OFFICE VISIT PLEASE FOLLOW-UP CLOSELY.  Your metabolic panel which shows your glucose, kidney function, electrolytes, and liver function is  OTHERWISE WITHIN NORMAL LIMITS EXCEPT FOR ELEVATED GLUCOSE.  SEE ABOVE..   Thyroid studies are ABNORMAL.  PLEASE START THYROID MEDICATION AS PRESCRIBED AND FOLLOW-UP IN TWO MONTHS.  Your PSA SCREENING IS WITHIN NORMAL LIMITS   Your hemoglobin A1c is 11.6 WHICH IS WORSE THAN PREVIOUS EIGHT MONTHS AGO.  CLOSE DIABETES FOLLOW-UP IS RECOMMENDED TO MAKE SURE THAT WE ARE WORKING TO LOWER THIS LEVEL..  This test is gold standard screening test for diabetes.  It is a measures 3 months of your average blood  sugar.    REPEAT A1C EVERY THREE MONTHS.

## 2020-11-23 ENCOUNTER — OFFICE VISIT (OUTPATIENT)
Dept: FAMILY MEDICINE | Facility: CLINIC | Age: 50
End: 2020-11-23
Payer: MEDICARE

## 2020-11-23 VITALS
BODY MASS INDEX: 27.11 KG/M2 | DIASTOLIC BLOOD PRESSURE: 88 MMHG | WEIGHT: 193.63 LBS | SYSTOLIC BLOOD PRESSURE: 138 MMHG | HEIGHT: 71 IN | TEMPERATURE: 98 F | HEART RATE: 93 BPM

## 2020-11-23 DIAGNOSIS — Z12.11 COLON CANCER SCREENING: ICD-10-CM

## 2020-11-23 DIAGNOSIS — E11.42 TYPE 2 DIABETES MELLITUS WITH DIABETIC POLYNEUROPATHY, WITHOUT LONG-TERM CURRENT USE OF INSULIN: Primary | Chronic | ICD-10-CM

## 2020-11-23 DIAGNOSIS — Z72.0 TOBACCO CHEW USE: ICD-10-CM

## 2020-11-23 DIAGNOSIS — R11.0 NAUSEA: ICD-10-CM

## 2020-11-23 DIAGNOSIS — G89.4 CHRONIC PAIN SYNDROME: ICD-10-CM

## 2020-11-23 PROCEDURE — 3008F BODY MASS INDEX DOCD: CPT | Mod: HCNC,CPTII,S$GLB, | Performed by: FAMILY MEDICINE

## 2020-11-23 PROCEDURE — 99499 RISK ADDL DX/OHS AUDIT: ICD-10-PCS | Mod: S$GLB,,, | Performed by: FAMILY MEDICINE

## 2020-11-23 PROCEDURE — 99214 PR OFFICE/OUTPT VISIT, EST, LEVL IV, 30-39 MIN: ICD-10-PCS | Mod: HCNC,S$GLB,, | Performed by: FAMILY MEDICINE

## 2020-11-23 PROCEDURE — 99999 PR PBB SHADOW E&M-EST. PATIENT-LVL V: CPT | Mod: PBBFAC,HCNC,, | Performed by: FAMILY MEDICINE

## 2020-11-23 PROCEDURE — 1125F PR PAIN SEVERITY QUANTIFIED, PAIN PRESENT: ICD-10-PCS | Mod: HCNC,S$GLB,, | Performed by: FAMILY MEDICINE

## 2020-11-23 PROCEDURE — 3046F HEMOGLOBIN A1C LEVEL >9.0%: CPT | Mod: HCNC,CPTII,S$GLB, | Performed by: FAMILY MEDICINE

## 2020-11-23 PROCEDURE — 99214 OFFICE O/P EST MOD 30 MIN: CPT | Mod: HCNC,S$GLB,, | Performed by: FAMILY MEDICINE

## 2020-11-23 PROCEDURE — 1125F AMNT PAIN NOTED PAIN PRSNT: CPT | Mod: HCNC,S$GLB,, | Performed by: FAMILY MEDICINE

## 2020-11-23 PROCEDURE — 99499 UNLISTED E&M SERVICE: CPT | Mod: S$GLB,,, | Performed by: FAMILY MEDICINE

## 2020-11-23 PROCEDURE — 99999 PR PBB SHADOW E&M-EST. PATIENT-LVL V: ICD-10-PCS | Mod: PBBFAC,HCNC,, | Performed by: FAMILY MEDICINE

## 2020-11-23 PROCEDURE — 3046F PR MOST RECENT HEMOGLOBIN A1C LEVEL > 9.0%: ICD-10-PCS | Mod: HCNC,CPTII,S$GLB, | Performed by: FAMILY MEDICINE

## 2020-11-23 PROCEDURE — 3008F PR BODY MASS INDEX (BMI) DOCUMENTED: ICD-10-PCS | Mod: HCNC,CPTII,S$GLB, | Performed by: FAMILY MEDICINE

## 2020-11-23 RX ORDER — PEN NEEDLE, DIABETIC 31 GX3/16"
NEEDLE, DISPOSABLE MISCELLANEOUS
COMMUNITY
Start: 2020-10-30

## 2020-11-23 RX ORDER — PROMETHAZINE HYDROCHLORIDE 25 MG/1
25 TABLET ORAL EVERY 6 HOURS PRN
Qty: 30 TABLET | Refills: 0 | Status: SHIPPED | OUTPATIENT
Start: 2020-11-23 | End: 2021-12-03 | Stop reason: SDUPTHER

## 2020-11-23 RX ORDER — ONDANSETRON 8 MG/1
8 TABLET, ORALLY DISINTEGRATING ORAL EVERY 6 HOURS PRN
Qty: 30 TABLET | Refills: 0 | Status: SHIPPED | OUTPATIENT
Start: 2020-11-23 | End: 2020-12-23

## 2020-11-23 RX ORDER — PREGABALIN 75 MG/1
75 CAPSULE ORAL 2 TIMES DAILY
Qty: 60 CAPSULE | Refills: 6 | Status: SHIPPED | OUTPATIENT
Start: 2020-11-23 | End: 2021-02-11

## 2020-11-23 NOTE — ASSESSMENT & PLAN NOTE
TRIAL OF OZEMPIC ALONG WITH HIS OTHER OR MEDICATIONS TO TRY TO GET OFF OF INSULIN.  FOLLOW-UP IN 4 TO 6 WEEKS IF NOT CONTROLLED.  PATIENT WILL TITRATE OFF INSULIN IN THE NEXT FEW WEEKS.  LABS DUE IN FEBRUARY 2021.    We will plan to monitor hemoglobin A1c at designated intervals 3 to 6 months.  I recommend ongoing Education for diabetic diet and exercise protocol.  We will continue to monitor for side effects.    Please be advised of symptoms to monitor for and to notify me immediately if persistent or worsening.  Follow up with Ophthalmology/Optometry and Podiatry at least annually.

## 2020-11-23 NOTE — PROGRESS NOTES
PLAN:      Problem List Items Addressed This Visit     Type 2 diabetes mellitus with diabetic polyneuropathy, without long-term current use of insulin - Primary (Chronic)     TRIAL OF OZEMPIC ALONG WITH HIS OTHER OR MEDICATIONS TO TRY TO GET OFF OF INSULIN.  FOLLOW-UP IN 4 TO 6 WEEKS IF NOT CONTROLLED.  PATIENT WILL TITRATE OFF INSULIN IN THE NEXT FEW WEEKS.  LABS DUE IN FEBRUARY 2021.    We will plan to monitor hemoglobin A1c at designated intervals 3 to 6 months.  I recommend ongoing Education for diabetic diet and exercise protocol.  We will continue to monitor for side effects.    Please be advised of symptoms to monitor for and to notify me immediately if persistent or worsening.  Follow up with Ophthalmology/Optometry and Podiatry at least annually.           Colon cancer screening    Relevant Orders    Case Request Endoscopy: COLONOSCOPY (Completed)    Nausea    Relevant Medications    ondansetron (ZOFRAN-ODT) 8 MG TbDL    promethazine (PHENERGAN) 25 MG tablet    Chronic pain syndrome     REFERRAL TO PAIN MANAGEMENT.  START LYRICA FOR NEUROPATHY.         Relevant Medications    pregabalin (LYRICA) 75 MG capsule    Other Relevant Orders    Ambulatory referral/consult to Pain Clinic    Tobacco chew use     REFERRAL TO TOBACCO CESSATION.         Relevant Orders    Ambulatory referral/consult to Smoking Cessation Program        Future Appointments     Date Provider Specialty Appt Notes    2/2/2021  Lab     2/11/2021 Donnie Shrestha MD Family Medicine 3m dm fu           Medication List with Changes/Refills   New Medications    PREGABALIN (LYRICA) 75 MG CAPSULE    Take 1 capsule (75 mg total) by mouth 2 (two) times daily.    PROMETHAZINE (PHENERGAN) 25 MG TABLET    Take 1 tablet (25 mg total) by mouth every 6 (six) hours as needed for Nausea.   Current Medications    ASPIRIN (ECOTRIN) 81 MG EC TABLET    Take 1 tablet (81 mg total) by mouth once daily.    ATORVASTATIN (LIPITOR) 20 MG TABLET    Take 1 tablet (20  "mg total) by mouth once daily.    EMPAGLIFLOZIN (JARDIANCE) 25 MG TABLET    Take 1 tablet (25 mg total) by mouth once daily.    GLIPIZIDE (GLUCOTROL) 5 MG TABLET    Take 1 tablet (5 mg total) by mouth 2 (two) times daily with meals.    ICOSAPENT ETHYL (VASCEPA) 1 GRAM CAP    Take 2 g by mouth 2 (two) times daily.    INSULIN (LANTUS SOLOSTAR U-100 INSULIN) GLARGINE 100 UNITS/ML (3ML) SUBQ PEN    Inject 20 Units into the skin every evening.    LEVOTHYROXINE (SYNTHROID) 25 MCG TABLET    Take 1 tablet (25 mcg total) by mouth once daily. Repeat a TSH in my office in 2 months.    LORATADINE (CLARITIN) 10 MG TABLET    Take 10 mg by mouth.    SEMAGLUTIDE (OZEMPIC) 0.25 MG OR 0.5 MG(2 MG/1.5 ML) PNIJ    Inject 0.25 mg into the skin once a week for 30 days, THEN 0.5 mg once a week.    SURE COMFORT PEN NEEDLE 31 GAUGE X 3/16" NDLE       Changed and/or Refilled Medications    Modified Medication Previous Medication    ONDANSETRON (ZOFRAN-ODT) 8 MG TBDL ondansetron (ZOFRAN-ODT) 8 MG TbDL       Take 1 tablet (8 mg total) by mouth every 6 (six) hours as needed.    Take 1 tablet (8 mg total) by mouth every 6 (six) hours as needed.       Donnie Shrestha M.D.     ==========================================================================  Subjective:      Patient ID: Rashaun Mccormick is a 49 y.o. male.  has a past medical history of Anxiety, Arthritis, Cholelithiasis, Chronic pain, Chronic pain of both knees, Diabetes mellitus, type 2, Hyperlipidemia, and Nephrolithiasis.     Chief Complaint: Follow-up, Diabetes, and Pain      Problem List Items Addressed This Visit     Type 2 diabetes mellitus with diabetic polyneuropathy, without long-term current use of insulin - Primary (Chronic)    Overview     ==================================================  OCTOBER 2020:  Patient lost to follow-up.  Patient reports that he was noncompliant with his medications and actually never picked them up in February when they were prescribed.  Patient " presents today with increased nausea and vomiting and feeling very fatigued.  Point of care blood sugar is 438.Diabetes Management Status  Statin: TakingACE/ARB: Not taking  =================================================  February 2020:  Chronic.  Uncontrolled.  Patient states that he has been on insulin and other medications in the past.  Specifically metformin gave him severe diarrhea at different doses and he cannot take this medication.  Patient is currently on glipizide but reports intermittent compliance.  Patient did have a recent illness that made his blood sugars go up.  No recent steroids.  ==================================================  NOVEMBER 2020:  PATIENT HERE FOR HIS FOUR WEEK FOLLOW-UP.  PATIENT HAS BEEN VERY COMPLIANT WITH HIS MEDICATION AND HAS A DETAILED NOTE BOOK TODAY WITH BLOOD SUGARS DAILY.  FOURTEEN DAY AVERAGE BLOOD SUGAR LESS THAN 120.  COMPLIANT WITH GLIPIZIDE, JARDIANCE AND LANTUS.  PATIENT HAS NOT BEEN TAKING OZEMPIC.  Diabetes Management Status    Statin: Taking  ACE/ARB: Not taking    Screening or Prevention Patient's value Goal Complete/Controlled?   HgA1C Testing and Control   Lab Results   Component Value Date    HGBA1C 11.6 (H) 10/30/2020      Annually/Less than 8% No   Lipid profile : 02/27/2020 Annually Yes   LDL control Lab Results   Component Value Date    LDLCALC 123.4 02/27/2020    Annually/Less than 100 mg/dl  No   Nephropathy screening Lab Results   Component Value Date    LABMICR 106.0 02/27/2020     Lab Results   Component Value Date    PROTEINUA Negative 10/30/2020    Annually Yes   Blood pressure BP Readings from Last 1 Encounters:   11/23/20 (!) 150/87    Less than 140/90 No   Dilated retinal exam Most Recent Eye Exam Date: Not Found Annually No   Foot exam   : 02/27/2020 Annually Yes       =================================================         Current Assessment & Plan     TRIAL OF OZEMPIC ALONG WITH HIS OTHER OR MEDICATIONS TO TRY TO GET OFF OF INSULIN.   FOLLOW-UP IN 4 TO 6 WEEKS IF NOT CONTROLLED.  PATIENT WILL TITRATE OFF INSULIN IN THE NEXT FEW WEEKS.  LABS DUE IN FEBRUARY 2021.    We will plan to monitor hemoglobin A1c at designated intervals 3 to 6 months.  I recommend ongoing Education for diabetic diet and exercise protocol.  We will continue to monitor for side effects.    Please be advised of symptoms to monitor for and to notify me immediately if persistent or worsening.  Follow up with Ophthalmology/Optometry and Podiatry at least annually.           Colon cancer screening    Overview     Patient is due for age-appropriate screening for colon cancer.         Nausea    Overview     See diabetes.         Chronic pain syndrome    Overview     CHRONIC.  UNCONTROLLED.  PATIENT IS COMPLAINING OF POLYNEUROPATHY DUE TO DIABETES.  PATIENT HAS TRIED GABAPENTIN WITH SIDE EFFECTS.  PATIENT HAS BEEN TO PAIN MANAGEMENT WHO IS RECOMMENDING CBD BUT PATIENT CANNOT AFFORD THIS.         Current Assessment & Plan     REFERRAL TO PAIN MANAGEMENT.  START LYRICA FOR NEUROPATHY.         Tobacco chew use    Overview     Assistance with TOBACCO cessation was offered, including:  [x]  Medications  [x]  Counseling  [x]  Printed Information on Smoking Cessation  [x]  Referral to a Smoking Cessation Program    Patient was counseled regarding smoking for 3-10 minutes.             Current Assessment & Plan     REFERRAL TO TOBACCO CESSATION.                Past Medical History:  Past Medical History:   Diagnosis Date    Anxiety     Arthritis     Cholelithiasis     Chronic pain     Chronic pain of both knees     Diabetes mellitus, type 2     Hyperlipidemia     Nephrolithiasis      Past Surgical History:   Procedure Laterality Date    CHOLECYSTECTOMY      COLON SURGERY      KIDNEY SURGERY       Review of patient's allergies indicates:   Allergen Reactions    Metformin Diarrhea    Gabapentin Other (See Comments)     Shoulder pain    Codeine      Unknown^  Unknown^       "Penicillins Other (See Comments)     Anaphylaxis^  Anaphylaxis^  Never had a reaction, father had a bad reaction       Medication List with Changes/Refills   New Medications    PREGABALIN (LYRICA) 75 MG CAPSULE    Take 1 capsule (75 mg total) by mouth 2 (two) times daily.    PROMETHAZINE (PHENERGAN) 25 MG TABLET    Take 1 tablet (25 mg total) by mouth every 6 (six) hours as needed for Nausea.   Current Medications    ASPIRIN (ECOTRIN) 81 MG EC TABLET    Take 1 tablet (81 mg total) by mouth once daily.    ATORVASTATIN (LIPITOR) 20 MG TABLET    Take 1 tablet (20 mg total) by mouth once daily.    EMPAGLIFLOZIN (JARDIANCE) 25 MG TABLET    Take 1 tablet (25 mg total) by mouth once daily.    GLIPIZIDE (GLUCOTROL) 5 MG TABLET    Take 1 tablet (5 mg total) by mouth 2 (two) times daily with meals.    ICOSAPENT ETHYL (VASCEPA) 1 GRAM CAP    Take 2 g by mouth 2 (two) times daily.    INSULIN (LANTUS SOLOSTAR U-100 INSULIN) GLARGINE 100 UNITS/ML (3ML) SUBQ PEN    Inject 20 Units into the skin every evening.    LEVOTHYROXINE (SYNTHROID) 25 MCG TABLET    Take 1 tablet (25 mcg total) by mouth once daily. Repeat a TSH in my office in 2 months.    LORATADINE (CLARITIN) 10 MG TABLET    Take 10 mg by mouth.    SEMAGLUTIDE (OZEMPIC) 0.25 MG OR 0.5 MG(2 MG/1.5 ML) PNIJ    Inject 0.25 mg into the skin once a week for 30 days, THEN 0.5 mg once a week.    SURE COMFORT PEN NEEDLE 31 GAUGE X 3/16" NDLE       Changed and/or Refilled Medications    Modified Medication Previous Medication    ONDANSETRON (ZOFRAN-ODT) 8 MG TBDL ondansetron (ZOFRAN-ODT) 8 MG TbDL       Take 1 tablet (8 mg total) by mouth every 6 (six) hours as needed.    Take 1 tablet (8 mg total) by mouth every 6 (six) hours as needed.      Social History     Tobacco Use    Smoking status: Never Smoker    Smokeless tobacco: Never Used   Substance Use Topics    Alcohol use: Never     Frequency: Never      Family History   Problem Relation Age of Onset    Cancer Mother     COPD " "Father        I have reviewed the complete PMH, social history, surgical history, allergies and medications.  As well as family history.    Review of Systems   Constitutional: Negative for activity change and fatigue.   HENT: Negative for congestion and sinus pain.    Eyes: Negative for visual disturbance.   Respiratory: Negative for chest tightness and shortness of breath.    Cardiovascular: Negative for palpitations and leg swelling.   Gastrointestinal: Negative for abdominal pain, diarrhea and nausea.   Endocrine: Negative for polyuria.   Genitourinary: Negative for difficulty urinating and frequency.   Musculoskeletal: Positive for arthralgias, joint swelling and myalgias.   Skin: Negative for rash.   Neurological: Negative for dizziness and headaches.   Psychiatric/Behavioral: Negative for agitation. The patient is not nervous/anxious.      Objective:   /88   Pulse 93   Temp 97.7 °F (36.5 °C) (Oral)   Ht 5' 11" (1.803 m)   Wt 87.8 kg (193 lb 9.6 oz)   BMI 27.00 kg/m²   Physical Exam  Vitals signs and nursing note reviewed.   Constitutional:       General: He is not in acute distress.     Appearance: He is well-developed.      Comments: WALKS WITH CANE   HENT:      Head: Normocephalic and atraumatic.      Right Ear: External ear normal.      Left Ear: External ear normal.      Nose: Nose normal. No rhinorrhea.   Eyes:      Extraocular Movements: Extraocular movements intact.      Pupils: Pupils are equal, round, and reactive to light.   Neck:      Musculoskeletal: Normal range of motion and neck supple.   Cardiovascular:      Rate and Rhythm: Normal rate.      Pulses: Normal pulses.   Pulmonary:      Effort: Pulmonary effort is normal. No respiratory distress.      Breath sounds: Normal breath sounds.   Musculoskeletal: Normal range of motion.         General: Tenderness and deformity present.   Skin:     General: Skin is warm and dry.      Capillary Refill: Capillary refill takes less than 2 seconds. "   Neurological:      General: No focal deficit present.      Mental Status: He is alert and oriented to person, place, and time.   Psychiatric:         Mood and Affect: Mood normal.         Behavior: Behavior normal.         Assessment:     1. Type 2 diabetes mellitus with diabetic polyneuropathy, without long-term current use of insulin    2. Nausea    3. Chronic pain syndrome    4. Colon cancer screening    5. Tobacco chew use      MDM:   MODERATE COMPLEXITY.  MODERATE RISK.  I have Reviewed and summarized old records.  I have performed thorough medication reconciliation today and discussed risk and benefits of each medication.  I have reviewed labs and discussed with patient.  All questions were answered.  I am requesting old records and will review them once they are available.  PAIN MANAGEMENT    I have signed for the following orders AND/OR meds.  Orders Placed This Encounter   Procedures    Ambulatory referral/consult to Pain Clinic     Standing Status:   Future     Standing Expiration Date:   12/23/2021     Referral Priority:   Routine     Referral Type:   Consultation     Referral Reason:   Specialty Services Required     Referred to Provider:   Ramon Guo MD     Requested Specialty:   Pain Medicine     Number of Visits Requested:   1    Ambulatory referral/consult to Smoking Cessation Program     Standing Status:   Future     Standing Expiration Date:   12/23/2021     Referral Priority:   Routine     Referral Type:   Consultation     Referral Reason:   Specialty Services Required     Requested Specialty:   Addiction Medicine     Number of Visits Requested:   1    Case Request Endoscopy: COLONOSCOPY     Order Specific Question:   Pre-op Diagnosis     Answer:   Colon cancer screening [[id number]]     Order Specific Question:   CPT Code:     Answer:   MS COLORECTAL CANCER SCREEN RESULTS DOCUMENT/REVIEW [3017F]     Order Specific Question:   Case Referring Provider     Answer:   ANDRE SORIANO [[id number]]      Order Specific Question:   CPT Code:     Answer:   NM COLON CA SCRN NOT HI RSK IND []     Order Specific Question:   Medical Necessity:     Answer:   Medically Non-Urgent [100]     Order Specific Question:   Is an on-site pathologist required for this procedure?     Answer:   N/A     Medications Ordered This Encounter   Medications    ondansetron (ZOFRAN-ODT) 8 MG TbDL     Sig: Take 1 tablet (8 mg total) by mouth every 6 (six) hours as needed.     Dispense:  30 tablet     Refill:  0    pregabalin (LYRICA) 75 MG capsule     Sig: Take 1 capsule (75 mg total) by mouth 2 (two) times daily.     Dispense:  60 capsule     Refill:  6    promethazine (PHENERGAN) 25 MG tablet     Sig: Take 1 tablet (25 mg total) by mouth every 6 (six) hours as needed for Nausea.     Dispense:  30 tablet     Refill:  0        Follow up in about 4 months (around 3/23/2021), or if symptoms worsen or fail to improve, for DM.    If no improvement in symptoms or symptoms worsen, advised to call/follow-up at clinic or go to ER. Patient voiced understanding and all questions/concerns were addressed.     DISCLAIMER: This note was compiled by using a speech recognition dictation system and therefore please be aware that typographical / speech recognition errors can and do occur.  Please contact me if you see any errors specifically.    Donnie Shrestha M.D.       Office: [phone] [address] [place] [address]  FAX: [phone]

## 2020-11-25 ENCOUNTER — TELEPHONE (OUTPATIENT)
Dept: PAIN MEDICINE | Facility: CLINIC | Age: 50
End: 2020-11-25

## 2020-11-25 NOTE — TELEPHONE ENCOUNTER
----- Message from Daisy Tuttle sent at 11/25/2020  2:25 PM CST -----  Regarding: returning call  Contact: Wife Ms HartGibyzc-[phone]  Would like to consult with the nurse, patient is returning the nurse call, please call back thanks sj   .Type:  Patient Returning Call    Who Called: Ms Hart  Who Left Message for Patient:  Does the patient know what this is regarding?:  Would the patient rather a call back or a response via [facility]? callback  Best Call Back Number: [phone]  Additional Information:

## 2020-11-25 NOTE — TELEPHONE ENCOUNTER
Informed pt he is not approved for appt yet so we will keep 12/18. Pt understood. All questions answered.   Karl Gilliam MA  [facility]

## 2020-11-27 ENCOUNTER — TELEPHONE (OUTPATIENT)
Dept: PAIN MEDICINE | Facility: CLINIC | Age: 50
End: 2020-11-27

## 2020-11-27 NOTE — TELEPHONE ENCOUNTER
Pt will keep 12/18 appt . Pt understood. All questions answered.   Alexis Boyd,MA [facility]

## 2020-11-27 NOTE — TELEPHONE ENCOUNTER
----- Message from Cece Fulton sent at 11/27/2020  9:04 AM CST -----  Regarding: rtn call  Contact: Claudia Mccormick  Returning a call. [phone]

## 2021-02-02 ENCOUNTER — LAB VISIT (OUTPATIENT)
Dept: LAB | Facility: HOSPITAL | Age: 51
End: 2021-02-02
Attending: FAMILY MEDICINE
Payer: MEDICARE

## 2021-02-02 DIAGNOSIS — E11.42 TYPE 2 DIABETES MELLITUS WITH DIABETIC POLYNEUROPATHY, WITHOUT LONG-TERM CURRENT USE OF INSULIN: Chronic | ICD-10-CM

## 2021-02-02 DIAGNOSIS — E03.9 HYPOTHYROIDISM, UNSPECIFIED TYPE: ICD-10-CM

## 2021-02-02 DIAGNOSIS — E78.5 HYPERLIPIDEMIA LDL GOAL <130: ICD-10-CM

## 2021-02-02 DIAGNOSIS — Z79.899 ENCOUNTER FOR LONG-TERM (CURRENT) USE OF MEDICATIONS: ICD-10-CM

## 2021-02-02 LAB
ALBUMIN SERPL BCP-MCNC: 4.5 G/DL (ref 3.5–5.2)
ALP SERPL-CCNC: 74 U/L (ref 55–135)
ALT SERPL W/O P-5'-P-CCNC: 14 U/L (ref 10–44)
ANION GAP SERPL CALC-SCNC: 11 MMOL/L (ref 8–16)
AST SERPL-CCNC: 17 U/L (ref 10–40)
BILIRUB SERPL-MCNC: 0.7 MG/DL (ref 0.1–1)
BUN SERPL-MCNC: 14 MG/DL (ref 6–20)
CALCIUM SERPL-MCNC: 9.1 MG/DL (ref 8.7–10.5)
CHLORIDE SERPL-SCNC: 107 MMOL/L (ref 95–110)
CO2 SERPL-SCNC: 19 MMOL/L (ref 23–29)
CREAT SERPL-MCNC: 0.7 MG/DL (ref 0.5–1.4)
EST. GFR  (AFRICAN AMERICAN): >60 ML/MIN/1.73 M^2
EST. GFR  (NON AFRICAN AMERICAN): >60 ML/MIN/1.73 M^2
ESTIMATED AVG GLUCOSE: 131 MG/DL (ref 68–131)
GLUCOSE SERPL-MCNC: 119 MG/DL (ref 70–110)
HBA1C MFR BLD: 6.2 % (ref 4–5.6)
POTASSIUM SERPL-SCNC: 4.1 MMOL/L (ref 3.5–5.1)
PROT SERPL-MCNC: 7.5 G/DL (ref 6–8.4)
SODIUM SERPL-SCNC: 137 MMOL/L (ref 136–145)
T4 FREE SERPL-MCNC: 0.73 NG/DL (ref 0.71–1.51)
TSH SERPL DL<=0.005 MIU/L-ACNC: 12.67 UIU/ML (ref 0.4–4)

## 2021-02-02 PROCEDURE — 84443 ASSAY THYROID STIM HORMONE: CPT

## 2021-02-02 PROCEDURE — 83036 HEMOGLOBIN GLYCOSYLATED A1C: CPT

## 2021-02-02 PROCEDURE — 84439 ASSAY OF FREE THYROXINE: CPT

## 2021-02-02 PROCEDURE — 80053 COMPREHEN METABOLIC PANEL: CPT

## 2021-02-02 PROCEDURE — 36415 COLL VENOUS BLD VENIPUNCTURE: CPT | Mod: PO

## 2021-02-05 ENCOUNTER — PATIENT OUTREACH (OUTPATIENT)
Dept: ADMINISTRATIVE | Facility: HOSPITAL | Age: 51
End: 2021-02-05

## 2021-02-11 ENCOUNTER — OFFICE VISIT (OUTPATIENT)
Dept: FAMILY MEDICINE | Facility: CLINIC | Age: 51
End: 2021-02-11
Payer: MEDICARE

## 2021-02-11 VITALS
SYSTOLIC BLOOD PRESSURE: 128 MMHG | HEIGHT: 71 IN | HEART RATE: 82 BPM | TEMPERATURE: 97 F | BODY MASS INDEX: 27.58 KG/M2 | DIASTOLIC BLOOD PRESSURE: 86 MMHG | WEIGHT: 197 LBS

## 2021-02-11 DIAGNOSIS — E78.5 HYPERLIPIDEMIA LDL GOAL <130: ICD-10-CM

## 2021-02-11 DIAGNOSIS — Z13.220 ENCOUNTER FOR LIPID SCREENING FOR CARDIOVASCULAR DISEASE: ICD-10-CM

## 2021-02-11 DIAGNOSIS — K13.79 MOUTH PAIN: ICD-10-CM

## 2021-02-11 DIAGNOSIS — F33.1 DEPRESSION, MAJOR, RECURRENT, MODERATE: ICD-10-CM

## 2021-02-11 DIAGNOSIS — E03.9 ACQUIRED HYPOTHYROIDISM: ICD-10-CM

## 2021-02-11 DIAGNOSIS — Z00.01 ENCOUNTER FOR GENERAL ADULT MEDICAL EXAMINATION WITH ABNORMAL FINDINGS: Primary | ICD-10-CM

## 2021-02-11 DIAGNOSIS — Z13.6 ENCOUNTER FOR LIPID SCREENING FOR CARDIOVASCULAR DISEASE: ICD-10-CM

## 2021-02-11 DIAGNOSIS — Z79.899 ENCOUNTER FOR LONG-TERM (CURRENT) USE OF MEDICATIONS: ICD-10-CM

## 2021-02-11 DIAGNOSIS — Z12.11 COLON CANCER SCREENING: ICD-10-CM

## 2021-02-11 DIAGNOSIS — E11.42 TYPE 2 DIABETES MELLITUS WITH DIABETIC POLYNEUROPATHY, WITHOUT LONG-TERM CURRENT USE OF INSULIN: ICD-10-CM

## 2021-02-11 PROCEDURE — 1125F PR PAIN SEVERITY QUANTIFIED, PAIN PRESENT: ICD-10-PCS | Mod: S$GLB,,, | Performed by: FAMILY MEDICINE

## 2021-02-11 PROCEDURE — 3008F PR BODY MASS INDEX (BMI) DOCUMENTED: ICD-10-PCS | Mod: CPTII,S$GLB,, | Performed by: FAMILY MEDICINE

## 2021-02-11 PROCEDURE — 99396 PREV VISIT EST AGE 40-64: CPT | Mod: S$GLB,,, | Performed by: FAMILY MEDICINE

## 2021-02-11 PROCEDURE — 99396 PR PREVENTIVE VISIT,EST,40-64: ICD-10-PCS | Mod: S$GLB,,, | Performed by: FAMILY MEDICINE

## 2021-02-11 PROCEDURE — 99999 PR PBB SHADOW E&M-EST. PATIENT-LVL IV: ICD-10-PCS | Mod: PBBFAC,,, | Performed by: FAMILY MEDICINE

## 2021-02-11 PROCEDURE — 99499 UNLISTED E&M SERVICE: CPT | Mod: HCNC,S$GLB,, | Performed by: FAMILY MEDICINE

## 2021-02-11 PROCEDURE — 99499 RISK ADDL DX/OHS AUDIT: ICD-10-PCS | Mod: HCNC,S$GLB,, | Performed by: FAMILY MEDICINE

## 2021-02-11 PROCEDURE — 3044F PR MOST RECENT HEMOGLOBIN A1C LEVEL <7.0%: ICD-10-PCS | Mod: CPTII,S$GLB,, | Performed by: FAMILY MEDICINE

## 2021-02-11 PROCEDURE — 99999 PR PBB SHADOW E&M-EST. PATIENT-LVL IV: CPT | Mod: PBBFAC,,, | Performed by: FAMILY MEDICINE

## 2021-02-11 PROCEDURE — 3044F HG A1C LEVEL LT 7.0%: CPT | Mod: CPTII,S$GLB,, | Performed by: FAMILY MEDICINE

## 2021-02-11 PROCEDURE — 1125F AMNT PAIN NOTED PAIN PRSNT: CPT | Mod: S$GLB,,, | Performed by: FAMILY MEDICINE

## 2021-02-11 PROCEDURE — 3008F BODY MASS INDEX DOCD: CPT | Mod: CPTII,S$GLB,, | Performed by: FAMILY MEDICINE

## 2021-02-11 RX ORDER — DULOXETIN HYDROCHLORIDE 60 MG/1
60 CAPSULE, DELAYED RELEASE ORAL DAILY
Qty: 30 CAPSULE | Refills: 11 | Status: SHIPPED | OUTPATIENT
Start: 2021-02-11 | End: 2021-12-03

## 2021-02-11 RX ORDER — DULOXETIN HYDROCHLORIDE 30 MG/1
30 CAPSULE, DELAYED RELEASE ORAL DAILY
Qty: 7 CAPSULE | Refills: 0 | Status: SHIPPED | OUTPATIENT
Start: 2021-02-11 | End: 2021-02-18

## 2021-02-11 RX ORDER — ATORVASTATIN CALCIUM 20 MG/1
20 TABLET, FILM COATED ORAL DAILY
Qty: 90 TABLET | Refills: 4 | Status: SHIPPED | OUTPATIENT
Start: 2021-02-11 | End: 2022-02-17 | Stop reason: SDUPTHER

## 2021-02-22 ENCOUNTER — PATIENT OUTREACH (OUTPATIENT)
Dept: ADMINISTRATIVE | Facility: OTHER | Age: 51
End: 2021-02-22

## 2021-02-23 ENCOUNTER — TELEPHONE (OUTPATIENT)
Dept: OTOLARYNGOLOGY | Facility: CLINIC | Age: 51
End: 2021-02-23

## 2021-03-08 LAB
LEFT EYE DM RETINOPATHY: NEGATIVE
RIGHT EYE DM RETINOPATHY: NEGATIVE

## 2021-03-25 ENCOUNTER — PES CALL (OUTPATIENT)
Dept: ADMINISTRATIVE | Facility: CLINIC | Age: 51
End: 2021-03-25

## 2021-03-29 ENCOUNTER — PATIENT OUTREACH (OUTPATIENT)
Dept: ADMINISTRATIVE | Facility: HOSPITAL | Age: 51
End: 2021-03-29

## 2021-04-06 ENCOUNTER — PES CALL (OUTPATIENT)
Dept: ADMINISTRATIVE | Facility: CLINIC | Age: 51
End: 2021-04-06

## 2021-07-07 ENCOUNTER — PATIENT MESSAGE (OUTPATIENT)
Dept: ENDOSCOPY | Facility: HOSPITAL | Age: 51
End: 2021-07-07

## 2021-07-07 ENCOUNTER — PES CALL (OUTPATIENT)
Dept: ADMINISTRATIVE | Facility: CLINIC | Age: 51
End: 2021-07-07

## 2021-08-20 ENCOUNTER — PES CALL (OUTPATIENT)
Dept: ADMINISTRATIVE | Facility: CLINIC | Age: 51
End: 2021-08-20

## 2021-11-18 ENCOUNTER — PATIENT MESSAGE (OUTPATIENT)
Dept: ENDOSCOPY | Facility: HOSPITAL | Age: 51
End: 2021-11-18
Payer: MEDICARE

## 2021-12-03 ENCOUNTER — OFFICE VISIT (OUTPATIENT)
Dept: FAMILY MEDICINE | Facility: CLINIC | Age: 51
End: 2021-12-03
Payer: MEDICARE

## 2021-12-03 VITALS
WEIGHT: 189 LBS | HEIGHT: 71 IN | BODY MASS INDEX: 26.46 KG/M2 | HEART RATE: 89 BPM | SYSTOLIC BLOOD PRESSURE: 132 MMHG | DIASTOLIC BLOOD PRESSURE: 76 MMHG | TEMPERATURE: 98 F

## 2021-12-03 DIAGNOSIS — Z79.899 ENCOUNTER FOR LONG-TERM (CURRENT) USE OF MEDICATIONS: ICD-10-CM

## 2021-12-03 DIAGNOSIS — R11.0 NAUSEA: ICD-10-CM

## 2021-12-03 DIAGNOSIS — Z09 HOSPITAL DISCHARGE FOLLOW-UP: Primary | ICD-10-CM

## 2021-12-03 DIAGNOSIS — G89.18 POSTOPERATIVE PAIN: ICD-10-CM

## 2021-12-03 DIAGNOSIS — Z00.01 ENCOUNTER FOR GENERAL ADULT MEDICAL EXAMINATION WITH ABNORMAL FINDINGS: ICD-10-CM

## 2021-12-03 DIAGNOSIS — E78.5 HYPERLIPIDEMIA LDL GOAL <130: ICD-10-CM

## 2021-12-03 DIAGNOSIS — Z56.0 NOT CURRENTLY WORKING DUE TO DISABLED STATUS: ICD-10-CM

## 2021-12-03 DIAGNOSIS — E11.42 TYPE 2 DIABETES MELLITUS WITH DIABETIC POLYNEUROPATHY, WITHOUT LONG-TERM CURRENT USE OF INSULIN: Chronic | ICD-10-CM

## 2021-12-03 DIAGNOSIS — Z12.11 COLON CANCER SCREENING: ICD-10-CM

## 2021-12-03 DIAGNOSIS — Z09 HOSPITAL DISCHARGE FOLLOW-UP: ICD-10-CM

## 2021-12-03 DIAGNOSIS — F33.1 DEPRESSION, MAJOR, RECURRENT, MODERATE: ICD-10-CM

## 2021-12-03 DIAGNOSIS — Z13.6 ENCOUNTER FOR LIPID SCREENING FOR CARDIOVASCULAR DISEASE: ICD-10-CM

## 2021-12-03 DIAGNOSIS — M17.0 PRIMARY OSTEOARTHRITIS OF BOTH KNEES: ICD-10-CM

## 2021-12-03 DIAGNOSIS — Z13.220 ENCOUNTER FOR LIPID SCREENING FOR CARDIOVASCULAR DISEASE: ICD-10-CM

## 2021-12-03 DIAGNOSIS — E03.9 HYPOTHYROIDISM, UNSPECIFIED TYPE: ICD-10-CM

## 2021-12-03 DIAGNOSIS — L76.82 INCISIONAL PAIN: ICD-10-CM

## 2021-12-03 PROCEDURE — 99999 PR PBB SHADOW E&M-EST. PATIENT-LVL III: CPT | Mod: PBBFAC,HCNC,, | Performed by: NURSE PRACTITIONER

## 2021-12-03 PROCEDURE — 99213 OFFICE O/P EST LOW 20 MIN: CPT | Mod: HCNC,S$GLB,, | Performed by: NURSE PRACTITIONER

## 2021-12-03 PROCEDURE — 99213 PR OFFICE/OUTPT VISIT, EST, LEVL III, 20-29 MIN: ICD-10-PCS | Mod: HCNC,S$GLB,, | Performed by: NURSE PRACTITIONER

## 2021-12-03 PROCEDURE — 99499 UNLISTED E&M SERVICE: CPT | Mod: HCNC,S$GLB,, | Performed by: NURSE PRACTITIONER

## 2021-12-03 PROCEDURE — 99499 RISK ADDL DX/OHS AUDIT: ICD-10-PCS | Mod: HCNC,S$GLB,, | Performed by: NURSE PRACTITIONER

## 2021-12-03 PROCEDURE — 99999 PR PBB SHADOW E&M-EST. PATIENT-LVL III: ICD-10-PCS | Mod: PBBFAC,HCNC,, | Performed by: NURSE PRACTITIONER

## 2021-12-03 RX ORDER — ASPIRIN 81 MG/1
81 TABLET ORAL DAILY
Qty: 90 TABLET | Refills: 4 | Status: SHIPPED | OUTPATIENT
Start: 2021-12-03 | End: 2023-02-26

## 2021-12-03 RX ORDER — PANTOPRAZOLE SODIUM 40 MG/1
40 TABLET, DELAYED RELEASE ORAL DAILY
Qty: 90 TABLET | Refills: 4 | Status: SHIPPED | OUTPATIENT
Start: 2021-12-03

## 2021-12-03 RX ORDER — OXYCODONE AND ACETAMINOPHEN 5; 325 MG/1; MG/1
1 TABLET ORAL DAILY
COMMUNITY
Start: 2021-11-30 | End: 2021-12-03 | Stop reason: SDUPTHER

## 2021-12-03 RX ORDER — GLIPIZIDE 5 MG/1
5 TABLET ORAL 2 TIMES DAILY WITH MEALS
Qty: 180 TABLET | Refills: 4 | Status: SHIPPED | OUTPATIENT
Start: 2021-12-03 | End: 2022-02-17 | Stop reason: SDUPTHER

## 2021-12-03 RX ORDER — OXYCODONE AND ACETAMINOPHEN 5; 325 MG/1; MG/1
1 TABLET ORAL EVERY 6 HOURS PRN
Qty: 8 TABLET | Refills: 0 | Status: SHIPPED | OUTPATIENT
Start: 2021-12-03 | End: 2021-12-05

## 2021-12-03 RX ORDER — EMPAGLIFLOZIN 25 MG/1
25 TABLET, FILM COATED ORAL DAILY
Qty: 90 TABLET | Refills: 4 | Status: SHIPPED | OUTPATIENT
Start: 2021-12-03

## 2021-12-03 RX ORDER — LEVOTHYROXINE SODIUM 25 UG/1
25 TABLET ORAL DAILY
Qty: 90 TABLET | Refills: 4 | Status: SHIPPED | OUTPATIENT
Start: 2021-12-03 | End: 2022-02-17

## 2021-12-03 RX ORDER — PROMETHAZINE HYDROCHLORIDE 25 MG/1
25 TABLET ORAL EVERY 6 HOURS PRN
Qty: 30 TABLET | Refills: 0 | Status: SHIPPED | OUTPATIENT
Start: 2021-12-03 | End: 2022-02-11 | Stop reason: SDUPTHER

## 2021-12-03 RX ORDER — PANTOPRAZOLE SODIUM 40 MG/1
40 TABLET, DELAYED RELEASE ORAL DAILY
COMMUNITY
Start: 2021-12-01 | End: 2021-12-03 | Stop reason: SDUPTHER

## 2021-12-03 SDOH — SOCIAL DETERMINANTS OF HEALTH (SDOH): UNEMPLOYMENT, UNSPECIFIED: Z56.0

## 2021-12-07 ENCOUNTER — TELEPHONE (OUTPATIENT)
Dept: FAMILY MEDICINE | Facility: CLINIC | Age: 51
End: 2021-12-07
Payer: MEDICARE

## 2021-12-10 ENCOUNTER — TELEPHONE (OUTPATIENT)
Dept: FAMILY MEDICINE | Facility: CLINIC | Age: 51
End: 2021-12-10
Payer: MEDICARE

## 2022-01-24 ENCOUNTER — PATIENT OUTREACH (OUTPATIENT)
Dept: ADMINISTRATIVE | Facility: HOSPITAL | Age: 52
End: 2022-01-24
Payer: MEDICARE

## 2022-01-24 NOTE — PROGRESS NOTES
JEFF Alcala Colon Report: No colonoscopy found in chart or media. Called and spoke with patient to schedule colonoscopy declined pt wanted to see GI doctor first appointment scheduled 03-    Request faxed to obtain most recent DM EYE EXAM remind me set.

## 2022-01-26 ENCOUNTER — PATIENT OUTREACH (OUTPATIENT)
Dept: ADMINISTRATIVE | Facility: HOSPITAL | Age: 52
End: 2022-01-26
Payer: MEDICARE

## 2022-02-01 ENCOUNTER — PATIENT OUTREACH (OUTPATIENT)
Dept: ADMINISTRATIVE | Facility: HOSPITAL | Age: 52
End: 2022-02-01
Payer: MEDICARE

## 2022-02-07 ENCOUNTER — PATIENT OUTREACH (OUTPATIENT)
Dept: ADMINISTRATIVE | Facility: HOSPITAL | Age: 52
End: 2022-02-07
Payer: MEDICARE

## 2022-02-11 ENCOUNTER — OFFICE VISIT (OUTPATIENT)
Dept: FAMILY MEDICINE | Facility: CLINIC | Age: 52
End: 2022-02-11
Payer: MEDICARE

## 2022-02-11 VITALS
HEIGHT: 71 IN | OXYGEN SATURATION: 96 % | DIASTOLIC BLOOD PRESSURE: 88 MMHG | HEART RATE: 99 BPM | BODY MASS INDEX: 26.88 KG/M2 | WEIGHT: 192 LBS | SYSTOLIC BLOOD PRESSURE: 138 MMHG | TEMPERATURE: 97 F

## 2022-02-11 DIAGNOSIS — G89.29 CHRONIC ABDOMINAL PAIN: ICD-10-CM

## 2022-02-11 DIAGNOSIS — R10.9 CHRONIC ABDOMINAL PAIN: ICD-10-CM

## 2022-02-11 DIAGNOSIS — F33.1 DEPRESSION, MAJOR, RECURRENT, MODERATE: ICD-10-CM

## 2022-02-11 DIAGNOSIS — Z12.5 ENCOUNTER FOR PROSTATE CANCER SCREENING: ICD-10-CM

## 2022-02-11 DIAGNOSIS — G89.4 CHRONIC PAIN SYNDROME: ICD-10-CM

## 2022-02-11 DIAGNOSIS — E11.65 TYPE 2 DIABETES MELLITUS WITH HYPERGLYCEMIA, WITHOUT LONG-TERM CURRENT USE OF INSULIN: Primary | ICD-10-CM

## 2022-02-11 DIAGNOSIS — R11.0 NAUSEA: ICD-10-CM

## 2022-02-11 PROBLEM — E86.0 LUETSCHER'S SYNDROME: Status: ACTIVE | Noted: 2021-11-28

## 2022-02-11 PROBLEM — E87.1 HYPONATREMIA: Status: ACTIVE | Noted: 2021-11-28

## 2022-02-11 PROBLEM — R19.7 DIARRHEA: Status: ACTIVE | Noted: 2021-11-28

## 2022-02-11 PROCEDURE — 1159F PR MEDICATION LIST DOCUMENTED IN MEDICAL RECORD: ICD-10-PCS | Mod: HCNC,CPTII,S$GLB, | Performed by: FAMILY MEDICINE

## 2022-02-11 PROCEDURE — 1160F RVW MEDS BY RX/DR IN RCRD: CPT | Mod: HCNC,CPTII,S$GLB, | Performed by: FAMILY MEDICINE

## 2022-02-11 PROCEDURE — 99999 PR PBB SHADOW E&M-EST. PATIENT-LVL IV: CPT | Mod: PBBFAC,HCNC,, | Performed by: FAMILY MEDICINE

## 2022-02-11 PROCEDURE — 3075F SYST BP GE 130 - 139MM HG: CPT | Mod: HCNC,CPTII,S$GLB, | Performed by: FAMILY MEDICINE

## 2022-02-11 PROCEDURE — 99499 RISK ADDL DX/OHS AUDIT: ICD-10-PCS | Mod: S$GLB,,, | Performed by: FAMILY MEDICINE

## 2022-02-11 PROCEDURE — 99215 OFFICE O/P EST HI 40 MIN: CPT | Mod: HCNC,S$GLB,, | Performed by: FAMILY MEDICINE

## 2022-02-11 PROCEDURE — 99215 PR OFFICE/OUTPT VISIT, EST, LEVL V, 40-54 MIN: ICD-10-PCS | Mod: HCNC,S$GLB,, | Performed by: FAMILY MEDICINE

## 2022-02-11 PROCEDURE — 3079F PR MOST RECENT DIASTOLIC BLOOD PRESSURE 80-89 MM HG: ICD-10-PCS | Mod: HCNC,CPTII,S$GLB, | Performed by: FAMILY MEDICINE

## 2022-02-11 PROCEDURE — 3008F PR BODY MASS INDEX (BMI) DOCUMENTED: ICD-10-PCS | Mod: HCNC,CPTII,S$GLB, | Performed by: FAMILY MEDICINE

## 2022-02-11 PROCEDURE — 99499 UNLISTED E&M SERVICE: CPT | Mod: S$GLB,,, | Performed by: FAMILY MEDICINE

## 2022-02-11 PROCEDURE — 1159F MED LIST DOCD IN RCRD: CPT | Mod: HCNC,CPTII,S$GLB, | Performed by: FAMILY MEDICINE

## 2022-02-11 PROCEDURE — 3008F BODY MASS INDEX DOCD: CPT | Mod: HCNC,CPTII,S$GLB, | Performed by: FAMILY MEDICINE

## 2022-02-11 PROCEDURE — 3075F PR MOST RECENT SYSTOLIC BLOOD PRESS GE 130-139MM HG: ICD-10-PCS | Mod: HCNC,CPTII,S$GLB, | Performed by: FAMILY MEDICINE

## 2022-02-11 PROCEDURE — 3079F DIAST BP 80-89 MM HG: CPT | Mod: HCNC,CPTII,S$GLB, | Performed by: FAMILY MEDICINE

## 2022-02-11 PROCEDURE — 1160F PR REVIEW ALL MEDS BY PRESCRIBER/CLIN PHARMACIST DOCUMENTED: ICD-10-PCS | Mod: HCNC,CPTII,S$GLB, | Performed by: FAMILY MEDICINE

## 2022-02-11 PROCEDURE — 99999 PR PBB SHADOW E&M-EST. PATIENT-LVL IV: ICD-10-PCS | Mod: PBBFAC,HCNC,, | Performed by: FAMILY MEDICINE

## 2022-02-11 RX ORDER — PROMETHAZINE HYDROCHLORIDE 25 MG/1
25 TABLET ORAL EVERY 6 HOURS PRN
Qty: 30 TABLET | Refills: 12 | Status: SHIPPED | OUTPATIENT
Start: 2022-02-11 | End: 2022-04-13 | Stop reason: SDUPTHER

## 2022-02-11 RX ORDER — TRAMADOL HYDROCHLORIDE 50 MG/1
50 TABLET ORAL EVERY 6 HOURS
Qty: 28 TABLET | Refills: 0 | Status: SHIPPED | OUTPATIENT
Start: 2022-02-11 | End: 2022-02-24 | Stop reason: SDUPTHER

## 2022-02-11 NOTE — ASSESSMENT & PLAN NOTE
Short prescription of tramadol.  Patient will try to limit usage to once per day.  Hopefully this will control his pain. The patient was checked in the [facility] [employer]'s  Prescription Monitoring Program. There appears to be no incongruities with the patient's verbalized history.

## 2022-02-11 NOTE — PATIENT INSTRUCTIONS
Follow up in about 6 months (around 2022), or if symptoms worsen or fail to improve, for DM, LAB RESULTS.     Patient Education       Tramadol (TRA ma dole)   Brand Names: US ConZip; Qdolo; Ultram   Brand Names: Josephine APO-Tramadol; AURO-Tramadol; Durela; JAMP Tramadol; MAR-Tramadol; Ralivia; SANDOZ Tramadol; TARO-Tramadol ER; Tridural; Ultram [DSC]; Zytram XL   Warning   For all patients taking this drug:   · This drug is a strong pain drug that can put you at risk for addiction, abuse, and misuse. Misuse or abuse of this drug can lead to overdose and death. Talk with your doctor.  · You will be watched closely to make sure you do not misuse, abuse, or become addicted to this drug.  · This drug may cause very bad and sometimes deadly breathing problems. Call your doctor right away if you have slow, shallow, or trouble breathing.  · The chance of very bad and sometimes deadly breathing problems may be greater when you first start this drug or anytime your dose is raised.  · Even one dose of this drug may be deadly if it is taken by someone else or by accident, especially in children. If this drug is taken by someone else or by accident, get medical help right away.  · Keep all drugs in a safe place. Keep all drugs out of the reach of children and pets.  · Using this drug for a long time during pregnancy may lead to withdrawal in the  baby. This can be life-threatening. Talk with the doctor.  · This drug has an opioid drug in it. Severe side effects have happened when opioid drugs were used with benzodiazepines, alcohol, marijuana or other forms of cannabis, or prescription or OTC drugs that may cause drowsiness or slowed actions. This includes slow or troubled breathing and death. Benzodiazepines include drugs like alprazolam, diazepam, and lorazepam. Benzodiazepines may be used to treat many health problems like anxiety, trouble sleeping, or seizures. If you have questions, talk with the doctor.  · Many  drugs interact with this drug and can raise the chance of side effects like deadly breathing problems. Talk with your doctor and pharmacist to make sure it is safe to use this drug with all of your drugs.  · Do not take with alcohol or products that have alcohol. Unsafe and sometimes deadly effects may happen.  · Get medical help right away if you feel very sleepy, very dizzy, or if you pass out. Caregivers or others need to get medical help right away if the patient does not respond, does not answer or react like normal, or will not wake up.  Children:   · This drug is not approved for use in children. Do not give to a child younger than 12 years of age. Children between 12 and 18 years of age who are very overweight or have certain other health problems like sleep apnea or other lung or breathing problems must not use this drug. If your child has been given this drug, ask the doctor for information about the benefits and risks.  · Some children have had very bad and sometimes deadly breathing problems when using tramadol after surgery to remove tonsils or adenoids. Do not give to a child younger than 18 years of age who has had surgery to remove tonsils or adenoids. Talk with your child's doctor.  All liquid products:   · Be sure that you know how to measure your dose. Dosing errors can lead to accidental overdose and death. If you have any questions, talk with your doctor or pharmacist.  What is this drug used for?   · It is used to ease pain.    What do I need to tell my doctor BEFORE I take this drug?   · If you are allergic to this drug; any part of this drug; or any other drugs, foods, or substances. Tell your doctor about the allergy and what signs you had.  · If you have any of these health problems: Lung or breathing problems like asthma, trouble breathing, or sleep apnea; high levels of carbon dioxide in the blood; or stomach or bowel block or narrowing.  · If you have any of these health problems: Kidney  disease or liver disease.  · If you have thoughts of suicide or if you have ever had alcohol or other drug abuse or dependence.  · If you have been told by your doctor that you are a rapid metabolizer of some drugs.  · If you have recently drunk a lot of alcohol or taken a big amount of drugs that may slow your actions like phenobarbital or some pain drugs like oxycodone.  · If you are taking carbamazepine.  · If you are taking another drug that has the same drug in it.  · If you are taking any of these drugs: Buprenorphine, butorphanol, linezolid, methylene blue, nalbuphine, or pentazocine.  · If you have taken certain drugs for depression or Parkinson's disease in the last 14 days. This includes isocarboxazid, phenelzine, tranylcypromine, selegiline, or rasagiline. Very high blood pressure may happen.  · If you are breast-feeding. Do not breast-feed while you take this drug.  This is not a list of all drugs or health problems that interact with this drug.  Tell your doctor and pharmacist about all of your drugs (prescription or OTC, natural products, vitamins) and health problems. You must check to make sure that it is safe for you to take this drug with all of your drugs and health problems. Do not start, stop, or change the dose of any drug without checking with your doctor.  What are some things I need to know or do while I take this drug?   · Tell all of your health care providers that you take this drug. This includes your doctors, nurses, pharmacists, and dentists.  · Avoid driving and doing other tasks or actions that call for you to be alert until you see how this drug affects you.  · To lower the chance of feeling dizzy or passing out, rise slowly if you have been sitting or lying down. Be careful going up and down stairs.  · Do not take more than what your doctor told you to take. Taking more than you are told may raise your chance of very bad side effects.  · Do not take this drug with other strong pain  drugs or if you are using a pain patch without talking to your doctor first.  · This drug may raise the chance of seizures. The chance may be higher in people who have certain health problems, use certain other drugs, or drink a lot of alcohol. Talk to your doctor to see if you have a greater chance of seizures while taking this drug.  · If you have been taking this drug for a long time or at high doses, it may not work as well and you may need higher doses to get the same effect. This is known as tolerance. Call your doctor if this drug stops working well. Do not take more than ordered.  · Low blood sugar has happened with this drug. Sometimes, people have had to go to the hospital. Call your doctor right away if you have signs of low blood sugar like dizziness, headache, feeling sleepy or weak, shaking, a fast heartbeat, confusion, hunger, or sweating.  · Long-term or regular use of opioid drugs like this drug may lead to dependence. Lowering the dose or stopping this drug all of a sudden may cause a greater risk of withdrawal or other severe problems. Talk to your doctor before you lower the dose or stop this drug. You will need to follow your doctors instructions. Tell your doctor if you have more pain, mood changes, thoughts of suicide, or any other bad effects.  · Long-term use of an opioid drug may lead to lower sex hormone levels. Call your doctor if you have a lowered interest in sex, fertility problems, no menstrual period, or ejaculation problems.  · Taking an opioid drug like this drug may lead to a rare but very bad adrenal gland problem. Call your doctor right away if you have very bad dizziness or passing out, very bad upset stomach or throwing up, or if you feel less hungry, very tired, or very weak.  · If you are 65 or older, use this drug with care. You could have more side effects.  · This drug may cause harm to the unborn baby if you take it while you are pregnant. If you are pregnant or you get  pregnant while taking this drug, call your doctor right away.    What are some side effects that I need to call my doctor about right away?   WARNING/CAUTION: Even though it may be rare, some people may have very bad and sometimes deadly side effects when taking a drug. Tell your doctor or get medical help right away if you have any of the following signs or symptoms that may be related to a very bad side effect:  · Signs of an allergic reaction, like rash; hives; itching; red, swollen, blistered, or peeling skin with or without fever; wheezing; tightness in the chest or throat; trouble breathing, swallowing, or talking; unusual hoarseness; or swelling of the mouth, face, lips, tongue, or throat. Rarely, some allergic reactions have been deadly.  · Signs of depression, suicidal thoughts, emotional ups and downs, abnormal thinking, anxiety, or lack of interest in life.  · Signs of low sodium levels like headache, trouble focusing, memory problems, feeling confused, weakness, seizures, or change in balance.  · Very bad dizziness or passing out.  · Feeling confused.  · Seizures.  · Chest pain or pressure or a fast heartbeat.  · Trouble passing urine.  · Passing urine more often.  · Trouble breathing, slow breathing, or shallow breathing.  · Noisy breathing.  · Breathing problems during sleep (sleep apnea).  · Change in eyesight.  · Severe constipation or stomach pain. These may be signs of a severe bowel problem.  · A severe and sometimes deadly problem called serotonin syndrome may happen. The risk may be greater if you also take certain other drugs. Call your doctor right away if you have agitation; change in balance; confusion; hallucinations; fever; fast or abnormal heartbeat; flushing; muscle twitching or stiffness; seizures; shivering or shaking; sweating a lot; severe diarrhea, upset stomach, or throwing up; or very bad headache.  · A severe skin reaction (Lee-Perry syndrome/toxic epidermal necrolysis) may  happen. It can cause severe health problems that may not go away, and sometimes death. Get medical help right away if you have signs like red, swollen, blistered, or peeling skin (with or without fever); red or irritated eyes; or sores in your mouth, throat, nose, or eyes.  What are some other side effects of this drug?   All drugs may cause side effects. However, many people have no side effects or only have minor side effects. Call your doctor or get medical help if any of these side effects or any other side effects bother you or do not go away:  · Feeling dizzy, sleepy, tired, or weak.  · Constipation, diarrhea, throwing up, or upset stomach.  · Dry mouth.  · Headache.  · Itching.  · Trouble sleeping.  · Flushing.  · Sweating a lot.  These are not all of the side effects that may occur. If you have questions about side effects, call your doctor. Call your doctor for medical advice about side effects.  You may report side effects to your national health agency.  You may report side effects to the FDA at 1-737.724.3387. You may also report side effects at https://www.fda.gov/medwatch.  How is this drug best taken?   Use this drug as ordered by your doctor. Read all information given to you. Follow all instructions closely.  All products:   · Take by mouth only.  · Do not inject or snort this drug. Doing any of these things can cause very bad side effects like trouble breathing and death from overdose.  All liquid products and tablets:   · Take with or without food. Take with food if it causes an upset stomach.  All liquid products:   · Measure liquid doses carefully. Use the measuring device that comes with this drug. If there is none, ask the pharmacist for a device to measure this drug.  · Do not use a household teaspoon or tablespoon to measure this drug. Doing so could lead to the dose being too high.  Liquid (suspension):   · Shake well before use.  All extended-release products:   · Swallow whole. Do not  chew, break, crush, or dissolve before swallowing. Doing these things can cause very bad side effects and death.  · Take this drug with or without food. Some products need to be taken the same way each time, either always with food or always without food. Be sure you know how to take your product with regard to food. If you are not sure how to take your product with regard to food, talk with your doctor or pharmacist.  · Take this drug at the same time of day.  · Do not use for fast pain relief or on an as needed basis.  · Do not use for pain relief after surgery if you have not been taking drugs like this drug.  · If you have trouble swallowing, talk with your doctor.  What do I do if I miss a dose?   · Take a missed dose as soon as you think about it.  · If it is close to the time for your next dose, skip the missed dose and go back to your normal time.  · Do not take 2 doses at the same time or extra doses.  · Some products are to be taken to ease pain as needed. If you are taking this drug as needed, do not take more often than told by your doctor.    How do I store and/or throw out this drug?   All products:   · Store at room temperature in a dry place. Do not store in a bathroom.  · Store this drug in a safe place where children cannot see or reach it, and where other people cannot get to it. A locked box or area may help keep this drug safe. Keep all drugs away from pets.  · Throw away unused or  drugs. Do not flush down a toilet or pour down a drain unless you are told to do so. Check with your pharmacist if you have questions about the best way to throw out drugs. There may be drug take-back programs in your area.  Liquid (suspension):   · Be sure you know how long you can store this drug before you need to throw it away.  General drug facts   · If your symptoms or health problems do not get better or if they become worse, call your doctor.  · Do not share your drugs with others and do not take  anyone else's drugs.  · Some drugs may have another patient information leaflet. If you have any questions about this drug, please talk with your doctor, nurse, pharmacist, or other health care provider.  · This drug comes with an extra patient fact sheet called a Medication Guide. Read it with care. Read it again each time this drug is refilled. If you have any questions about this drug, please talk with the doctor, pharmacist, or other health care provider.  · A drug called naloxone can be used to help treat an overdose of this drug. Your doctor may order naloxone for you to keep with you while you take this drug. If you have questions about how to get or use naloxone, talk with your doctor or pharmacist. If you think there has been an overdose, get medical care right away even if naloxone has been used. Be ready to tell or show what was taken, how much, and when it happened.    Consumer Information Use and Disclaimer   This generalized information is a limited summary of diagnosis, treatment, and/or medication information. It is not meant to be comprehensive and should be used as a tool to help the user understand and/or assess potential diagnostic and treatment options. It does NOT include all information about conditions, treatments, medications, side effects, or risks that may apply to a specific patient. It is not intended to be medical advice or a substitute for the medical advice, diagnosis, or treatment of a health care provider based on the health care provider's examination and assessment of a patient's specific and unique circumstances. Patients must speak with a health care provider for complete information about their health, medical questions, and treatment options, including any risks or benefits regarding use of medications. This information does not endorse any treatments or medications as safe, effective, or approved for treating a specific patient. [employer] and its affiliates disclaim any  warranty or liability relating to this information or the use thereof. The use of this information is governed by the Terms of Use, available at https://www.wolterskluwer.com/en/solutions/lexicomp/about/donald.  Last Reviewed Date   2021-07-15  Copyright   © 2021 [employer] and its affiliates and/or licensors. All rights reserved.        Dear patient,   As a result of recent federal legislation (The Federal Cures Act), you may receive lab or pathology results from your visit in your [facility] account before your physician is able to contact you. Your physician or their representative will relay the results to you with their recommendations at their soonest availability.     If no improvement in symptoms or symptoms worsen, please be advised to call MD, follow-up at clinic and/or go to ER if becomes severe.    Donnie Shrestha M.D.        We Offer TELEHEALTH & Same Day Appointments!   Book your Telehealth appointment with me through my nurse or   Clinic appointments on [email or web address]!    [address] [place] [address]    Office: [phone]   FAX: [phone]    Check out my Facebook Page and Follow Me at: [email or web address]    Check out my website at [facility] by clicking on: [email or web address]    To Schedule appointments online, go to [email or web address]: [email or web address] Patient Education       Promethazine (proe METH a zeen)   Brand Names: [place] Phenadoz [DSC]; Phenergan; Promethegan   Warning   All products:   · Do not give this drug to a child younger than 2 years of age. It may cause very bad and sometimes deadly breathing problems.  · Use with care in children 2 years of age and older. Talk with the doctor.  · Before your child takes this drug, tell the doctor if your child is taking any drugs that can cause breathing problems. There are many drugs that can do this. Ask the doctor or pharmacist if you are not  sure.  Injection:   · Tissue damage has happened with this drug. Sometimes, this has led to surgery. Tell your nurse if you have any burning, color changes, pain, skin breakdown, or swelling where the shot was given.  What is this drug used for?   · It is used to ease allergy signs.  · It is used to help motion sickness.  · It is used to ease pain.  · It is used to prevent upset stomach and throwing up from surgery.  · It is used during surgery.  · It may be given to you for other reasons. Talk with the doctor.    What do I need to tell my doctor BEFORE I take this drug?   · If you are allergic to this drug; any part of this drug; or any other drugs, foods, or substances. Tell your doctor about the allergy and what signs you had.  · If you have any of these health problems: Asthma or other breathing problems like COPD (chronic obstructive pulmonary disease) or sleep apnea (breathing problems during sleep).  · If you have any of these health problems: Liver problems or Reye's syndrome.  This is not a list of all drugs or health problems that interact with this drug.  Tell your doctor and pharmacist about all of your drugs (prescription or OTC, natural products, vitamins) and health problems. You must check to make sure that it is safe for you to take this drug with all of your drugs and health problems. Do not start, stop, or change the dose of any drug without checking with your doctor.  What are some things I need to know or do while I take this drug?   · Tell all of your health care providers that you take this drug. This includes your doctors, nurses, pharmacists, and dentists.  · Avoid driving and doing other tasks or actions that call for you to be alert or have clear eyesight until you see how this drug affects you.  · This drug may cause the results of some pregnancy tests to be wrong. Talk with the doctor.  · If you have high blood sugar (diabetes), you will need to watch your blood sugar closely.  · Talk  with your doctor before you use alcohol, marijuana or other forms of cannabis, or prescription or OTC drugs that may slow your actions.  · You may get sunburned more easily. Avoid sun, sunlamps, and tanning beds. Use sunscreen and wear clothing and eyewear that protects you from the sun.  · This drug may raise the chance of seizures in some people, including people who have had seizures in the past. Talk to your doctor to see if you have a greater chance of seizures while taking this drug.  · If you are allergic to sulfites, talk with your doctor. Some products have sulfites.  · If you are 65 or older, use this drug with care. You could have more side effects.  · Tell your doctor if you are pregnant, plan on getting pregnant, or are breast-feeding. You will need to talk about the benefits and risks to you and the baby.    What are some side effects that I need to call my doctor about right away?   WARNING/CAUTION: Even though it may be rare, some people may have very bad and sometimes deadly side effects when taking a drug. Tell your doctor or get medical help right away if you have any of the following signs or symptoms that may be related to a very bad side effect:  · Signs of an allergic reaction, like rash; hives; itching; red, swollen, blistered, or peeling skin with or without fever; wheezing; tightness in the chest or throat; trouble breathing, swallowing, or talking; unusual hoarseness; or swelling of the mouth, face, lips, tongue, or throat.  · Signs of high or low blood pressure like very bad headache or dizziness, passing out, or change in eyesight.  · Fast or slow heartbeat.  · Trouble controlling body movements, twitching, change in balance, trouble swallowing or speaking.  · Shakiness, trouble moving around, or stiffness.  · Feeling confused.  · Feeling nervous and excitable.  · Hallucinations (seeing or hearing things that are not there).  · Mood changes.  · Ringing in ears.  · Seizures.  · Any  unexplained bruising or bleeding.  · Yellow skin or eyes.  · Change in eyesight.  · A very bad and sometimes deadly health problem called neuroleptic malignant syndrome (NMS) may happen. Call your doctor right away if you have any fever, muscle cramps or stiffness, dizziness, very bad headache, confusion, change in thinking, fast heartbeat, heartbeat that does not feel normal, or are sweating a lot.  · This drug may cause very bad and sometimes deadly breathing problems. Call your doctor right away if you have slow, shallow, or trouble breathing.  · Low white blood cell counts have happened with this drug. This may lead to a higher chance of getting an infection. Call your doctor right away if you have signs of infection like fever, chills, or sore throat.  What are some other side effects of this drug?   All drugs may cause side effects. However, many people have no side effects or only have minor side effects. Call your doctor or get medical help if any of these side effects or any other side effects bother you or do not go away:  · Feeling dizzy, sleepy, tired, or weak.  · Blurred eyesight.  · Dry mouth.  · Upset stomach or throwing up.  · Trouble sleeping.  · Stuffy nose.  These are not all of the side effects that may occur. If you have questions about side effects, call your doctor. Call your doctor for medical advice about side effects.  You may report side effects to your national health agency.  You may report side effects to the FDA at 1-682.283.6253. You may also report side effects at https://www.fda.gov/medwatch.  How is this drug best taken?   Use this drug as ordered by your doctor. Read all information given to you. Follow all instructions closely.  All oral products:   · Take with or without food. Take with food if it causes an upset stomach.  · Take 30 to 60 minutes before travel if using for motion sickness.  Liquid:   · Measure liquid doses carefully. Use the measuring device that comes with this  drug. If there is none, ask the pharmacist for a device to measure this drug.  · Do not use a household teaspoon or tablespoon to measure this drug. Doing so could lead to the dose being too high.  Suppository:   · Use suppository rectally.  · Wash your hands before and after use.  · If suppository is soft, chill in a refrigerator or run cold water over it.  · Take off .  · Put suppository into the rectum with gentle pressure, pointed end first. Do not handle too much.  Injection:   · It is given as a shot into a muscle.  · It may be given as a shot into a vein.  What do I do if I miss a dose?   Oral products and suppository:   · If you take this drug on a regular basis, take a missed dose as soon as you think about it.  · If it is close to the time for your next dose, skip the missed dose and go back to your normal time.  · Do not take 2 doses at the same time or extra doses.  · Many times this drug is taken on an as needed basis. Do not take more often than told by the doctor.  Injection:   · Call your doctor to find out what to do.  How do I store and/or throw out this drug?   All oral products:   · Store at room temperature protected from light. Store in a dry place. Do not store in a bathroom.  · Keep lid tightly closed.  Suppository:   · Store suppositories in a refrigerator. Do not freeze.  Injection:   · If you need to store this drug at home, talk with your doctor, nurse, or pharmacist about how to store it.  All products:   · Keep all drugs in a safe place. Keep all drugs out of the reach of children and pets.  · Throw away unused or  drugs. Do not flush down a toilet or pour down a drain unless you are told to do so. Check with your pharmacist if you have questions about the best way to throw out drugs. There may be drug take-back programs in your area.  General drug facts   · If your symptoms or health problems do not get better or if they become worse, call your doctor.  · Do not  share your drugs with others and do not take anyone else's drugs.  · Some drugs may have another patient information leaflet. If you have any questions about this drug, please talk with your doctor, nurse, pharmacist, or other health care provider.  · Some drugs may have another patient information leaflet. Check with your pharmacist. If you have any questions about this drug, please talk with your doctor, nurse, pharmacist, or other health care provider.  · If you think there has been an overdose, call your poison control center or get medical care right away. Be ready to tell or show what was taken, how much, and when it happened.    Consumer Information Use and Disclaimer   This generalized information is a limited summary of diagnosis, treatment, and/or medication information. It is not meant to be comprehensive and should be used as a tool to help the user understand and/or assess potential diagnostic and treatment options. It does NOT include all information about conditions, treatments, medications, side effects, or risks that may apply to a specific patient. It is not intended to be medical advice or a substitute for the medical advice, diagnosis, or treatment of a health care provider based on the health care provider's examination and assessment of a patient's specific and unique circumstances. Patients must speak with a health care provider for complete information about their health, medical questions, and treatment options, including any risks or benefits regarding use of medications. This information does not endorse any treatments or medications as safe, effective, or approved for treating a specific patient. [employer] and its affiliates disclaim any warranty or liability relating to this information or the use thereof. The use of this information is governed by the Terms of Use, available at https://www.[employer].com/en/solutions/lexicomp/about/donald.  Last Reviewed Date    2021-08-03  Copyright   © 2021 [employer] and its affiliates and/or licensors. All rights reserved.

## 2022-02-11 NOTE — ASSESSMENT & PLAN NOTE
Update labs.  Continue current medication regimen.  Patient is off of insulin at this time.We will plan to monitor hemoglobin A1c at designated intervals 3 to 6 months.  I recommend ongoing Education for diabetic diet and exercise protocol.  We will continue to monitor for side effects.    Please be advised of symptoms to monitor for and to notify me immediately if persistent or worsening.  Follow up with Ophthalmology/Optometry and Podiatry at least annually.

## 2022-02-11 NOTE — ASSESSMENT & PLAN NOTE
Refill Phenergan.  Follow-up with gastroenterology if no improvement.    Please be advised constipation condition course.  I recommend increase daily water intake to an acceptable level.  Increase fiber.  Recommend stool softener and laxative if needed.  Goal of 1 bowel movement daily.  Follow-up to clinic or notify me if no improvement or symptoms are persistent or worsening.  ER precautions were given.  Recommend daily exercise as tolerated, adequate water intake (six 8-oz glasses of water daily), and high fiber diet. OTC fiber supplements are recommended if diet does not reach daily fiber goal (20-30 grams daily), such as Metamucil, Citrucel, or FiberCon (take as directed, separate from other oral medications by >2 hours).  - CONTINUE OTC stool softener such as Colace as directed to avoid hard stools and straining with bowel movements PRN  -If still no improvement with these measures, call/follow-up

## 2022-02-11 NOTE — PROGRESS NOTES
PLAN:      Problem List Items Addressed This Visit     Depression, major, recurrent, moderate (Chronic)     Treating pain.  See other problem.  Patient defers and refuses any antidepressants.Please be advised of condition course.  SNRI/SSRI is first-line treatment for this condition.  Please be advised of the risk of discontinuing this medication without tapering/contacting MD.  Patient has been advised of side effects, and all questions were answered.  Patient voiced understanding.  Patient will follow up routinely and notify us if having any side effects or worsening or persistent symptoms.  ER precautions were given. Antidepressant/Antianxiety Medication Initiation:  Patient informed of risks, benefits, and potential side effects of medication and accepts informed consent.  Common side effects include nausea, fatigue, headache, insomnia, etc see medication insert for complete side effect profile.  Most importantly be advised of the possibility of new or worsening suicidal thoughts/depression/anxiety etcetera.  Please be advised to stop the medication immediately and seek urgent treatment if this occurs.  Therefore please do not to abruptly discontinue medication without physician guidance except in cases of sudden onset or worsening of SI.            Chronic pain syndrome (Chronic)     Short prescription of tramadol.  Patient will try to limit usage to once per day.  Hopefully this will control his pain. The patient was checked in the [facility] [employer]'s  Prescription Monitoring Program. There appears to be no incongruities with the patient's verbalized history.            Relevant Medications    traMADoL (ULTRAM) 50 mg tablet    Chronic abdominal pain (Chronic)     Patient was referred to Gastroenterology and appointment has been made.  Follow-up with Gastroenterology, General surgery.  ER precautions for severe pain.  Constipation precautions with his history of surgical intervention in the  abdominal cavity.  Start tramadol for severe pain.         Relevant Medications    traMADoL (ULTRAM) 50 mg tablet    Type 2 diabetes mellitus with hyperglycemia, without long-term current use of insulin - Primary (Chronic)     Update labs.  Continue current medication regimen.  Patient is off of insulin at this time.We will plan to monitor hemoglobin A1c at designated intervals 3 to 6 months.  I recommend ongoing Education for diabetic diet and exercise protocol.  We will continue to monitor for side effects.    Please be advised of symptoms to monitor for and to notify me immediately if persistent or worsening.  Follow up with Ophthalmology/Optometry and Podiatry at least annually.           Nausea     Refill Phenergan.  Follow-up with gastroenterology if no improvement.    Please be advised constipation condition course.  I recommend increase daily water intake to an acceptable level.  Increase fiber.  Recommend stool softener and laxative if needed.  Goal of 1 bowel movement daily.  Follow-up to clinic or notify me if no improvement or symptoms are persistent or worsening.  ER precautions were given.  Recommend daily exercise as tolerated, adequate water intake (six 8-oz glasses of water daily), and high fiber diet. OTC fiber supplements are recommended if diet does not reach daily fiber goal (20-30 grams daily), such as Metamucil, Citrucel, or FiberCon (take as directed, separate from other oral medications by >2 hours).  - CONTINUE OTC stool softener such as Colace as directed to avoid hard stools and straining with bowel movements PRN  -If still no improvement with these measures, call/follow-up           Relevant Medications    promethazine (PHENERGAN) 25 MG tablet      Other Visit Diagnoses     Encounter for prostate cancer screening        Relevant Orders    PSA, Screening        Future Appointments     Date Provider Specialty Appt Notes    2/15/2022  Lab     3/3/2022 José Manuel Rene Jr., MD  "Gastroenterology Consult after surgery    5/11/2022 Kristina Posey NP Family Medicine 3 month f/u    7/25/2022 Donnie Shrestha MD Family Medicine 6 month f/u         Medication Management for assessment above:   Medication List with Changes/Refills   New Medications    TRAMADOL (ULTRAM) 50 MG TABLET    Take 1 tablet (50 mg total) by mouth every 6 (six) hours.   Current Medications    ASPIRIN (ECOTRIN) 81 MG EC TABLET    Take 1 tablet (81 mg total) by mouth once daily.    ATORVASTATIN (LIPITOR) 20 MG TABLET    Take 1 tablet (20 mg total) by mouth once daily.    EMPAGLIFLOZIN (JARDIANCE) 25 MG TABLET    Take 1 tablet (25 mg total) by mouth once daily.    GLIPIZIDE (GLUCOTROL) 5 MG TABLET    Take 1 tablet (5 mg total) by mouth 2 (two) times daily with meals.    ICOSAPENT ETHYL (VASCEPA) 1 GRAM CAP    Take 2 g by mouth 2 (two) times daily.    LEVOTHYROXINE (SYNTHROID) 25 MCG TABLET    Take 1 tablet (25 mcg total) by mouth once daily. Repeat a TSH in my office in 2 months.    LORATADINE (CLARITIN) 10 MG TABLET    Take 10 mg by mouth.    PANTOPRAZOLE (PROTONIX) 40 MG TABLET    Take 1 tablet (40 mg total) by mouth once daily.    SURE COMFORT PEN NEEDLE 31 GAUGE X 3/16" NDLE       Changed and/or Refilled Medications    Modified Medication Previous Medication    PROMETHAZINE (PHENERGAN) 25 MG TABLET promethazine (PHENERGAN) 25 MG tablet       Take 1 tablet (25 mg total) by mouth every 6 (six) hours as needed for Nausea.    Take 1 tablet (25 mg total) by mouth every 6 (six) hours as needed for Nausea.       Donnie Shrestha M.D.  ==========================================================================  Subjective:   Patient ID: Rashaun Mccormick is a 51 y.o. male.  has a past medical history of Anxiety, Arthritis, Cholelithiasis, Chronic pain, Chronic pain of both knees, Diabetes mellitus, type 2, Hyperlipidemia, and Nephrolithiasis.   Chief Complaint: Annual Exam      Problem List Items Addressed This Visit     " Depression, major, recurrent, moderate (Chronic)    Overview     Chronic.  Intermittent controlled.  Patient states that he does not wish to take any medications for this condition.  Patient cash with exercise and self meditation.  Denies any SI HI or hallucinations.  February 2022:  Patient reports that the last several months have been rough after his abdominal surgery.  Patient reports that his pain level has been uncontrolled.  His wife left him.  Patient has had deaths in the family.  Denies any SI HI hallucinations.    He has been on and failed many antidepressants and does not want to be on these medications.  Patient reports that his pain is the main issue.         Current Assessment & Plan     Treating pain.  See other problem.  Patient defers and refuses any antidepressants.Please be advised of condition course.  SNRI/SSRI is first-line treatment for this condition.  Please be advised of the risk of discontinuing this medication without tapering/contacting MD.  Patient has been advised of side effects, and all questions were answered.  Patient voiced understanding.  Patient will follow up routinely and notify us if having any side effects or worsening or persistent symptoms.  ER precautions were given. Antidepressant/Antianxiety Medication Initiation:  Patient informed of risks, benefits, and potential side effects of medication and accepts informed consent.  Common side effects include nausea, fatigue, headache, insomnia, etc see medication insert for complete side effect profile.  Most importantly be advised of the possibility of new or worsening suicidal thoughts/depression/anxiety etcetera.  Please be advised to stop the medication immediately and seek urgent treatment if this occurs.  Therefore please do not to abruptly discontinue medication without physician guidance except in cases of sudden onset or worsening of SI.            Chronic pain syndrome (Chronic)    Overview     CHRONIC.   UNCONTROLLED.  PATIENT IS COMPLAINING OF POLYNEUROPATHY DUE TO DIABETES.  PATIENT HAS TRIED GABAPENTIN WITH SIDE EFFECTS.  PATIENT HAS BEEN TO PAIN MANAGEMENT WHO IS RECOMMENDING CBD BUT PATIENT CANNOT AFFORD THIS.    February 2022:  Patient reports he continues to be in severe pain.  Patient is now having uncontrollable abdominal pain after recent surgery.         Current Assessment & Plan     Short prescription of tramadol.  Patient will try to limit usage to once per day.  Hopefully this will control his pain. The patient was checked in the [facility] Board of [employer]acy's  Prescription Monitoring Program. There appears to be no incongruities with the patient's verbalized history.            Chronic abdominal pain (Chronic)    Overview     Chronic.  Recurrent.  Worsened after recent surgery which he supposedly had intussusception.  Patient reports that the surgeon reported that it had resolved on its own once the surgery was performed.    Reviewed last note from general surgeon:  Mayra Ervin NP - 12/08/2021 12:30 PM CST  Formatting of this note might be different from the original.  The patient is in today for post operative appointment following diagnostic laparoscopy on 11/29/2021 with Dr. Gaspar for abnormal imaging suggestive of intussusception. Has been unable to schedule f/u apt with GI d/t referral issues. Patient reports blistering of skin from dressing he placed after surgery. When asked about pain that he was experiencing prior to surgery - he reports pain in his abdomen is chronic and has been occurring for years after his previous surgery in 2015 (colectomy for diverticulitis).    Incisions - healing well without evidence of infection. No erythema or drainage. Blistering of skin surrounding incision c/w adhesive reaction.    Spoke to Dr. Vazquez's office, pt's insurance is out of network.   All post operative instructions reviewed with patient. Verbalizes understanding.   Follow up PRN       "  Electronically signed by Mayra Ervin NP at 12/08/2021 12:41 PM CST      "         Current Assessment & Plan     Patient was referred to Gastroenterology and appointment has been made.  Follow-up with Gastroenterology, General surgery.  ER precautions for severe pain.  Constipation precautions with his history of surgical intervention in the abdominal cavity.  Start tramadol for severe pain.         Type 2 diabetes mellitus with hyperglycemia, without long-term current use of insulin - Primary (Chronic)    Overview     Diabetes Management Status    Statin: Taking  ACE/ARB: Not taking    Screening or Prevention Patient's value Goal Complete/Controlled?   HgA1C Testing and Control   Lab Results   Component Value Date    HGBA1C 6.2 (H) 02/02/2021      Annually/Less than 8% No   Lipid profile : 02/27/2020 Annually No   LDL control Lab Results   Component Value Date    LDLCALC 123.4 02/27/2020    Annually/Less than 100 mg/dl  No   Nephropathy screening Lab Results   Component Value Date    LABMICR 106.0 02/27/2020     Lab Results   Component Value Date    PROTEINUA Negative 10/30/2020     No results found for: UTPCR   Annually No   Blood pressure BP Readings from Last 1 Encounters:   02/11/22 138/88    Less than 140/90 Yes   Dilated retinal exam : 10/19/2021 Annually Yes   Foot exam   : 02/11/2021 Annually Yes     Patient reports compliance with medications.  Patient is due for labs.         Current Assessment & Plan     Update labs.  Continue current medication regimen.  Patient is off of insulin at this time.We will plan to monitor hemoglobin A1c at designated intervals 3 to 6 months.  I recommend ongoing Education for diabetic diet and exercise protocol.  We will continue to monitor for side effects.    Please be advised of symptoms to monitor for and to notify me immediately if persistent or worsening.  Follow up with Ophthalmology/Optometry and Podiatry at least annually.           Nausea    Overview     " Chronic.  Recurrent.  Patient had a major abdominal surgery in November.  He is requesting refill of Phenergan.         Current Assessment & Plan     Refill Phenergan.  Follow-up with gastroenterology if no improvement.    Please be advised constipation condition course.  I recommend increase daily water intake to an acceptable level.  Increase fiber.  Recommend stool softener and laxative if needed.  Goal of 1 bowel movement daily.  Follow-up to clinic or notify me if no improvement or symptoms are persistent or worsening.  ER precautions were given.  Recommend daily exercise as tolerated, adequate water intake (six 8-oz glasses of water daily), and high fiber diet. OTC fiber supplements are recommended if diet does not reach daily fiber goal (20-30 grams daily), such as Metamucil, Citrucel, or FiberCon (take as directed, separate from other oral medications by >2 hours).  - CONTINUE OTC stool softener such as Colace as directed to avoid hard stools and straining with bowel movements PRN  -If still no improvement with these measures, call/follow-up             Other Visit Diagnoses     Encounter for prostate cancer screening               Review of patient's allergies indicates:   Allergen Reactions    Metformin Diarrhea    Gabapentin Other (See Comments)     Shoulder pain    Codeine      Unknown^  Unknown^      Penicillins Other (See Comments)     Anaphylaxis^  Anaphylaxis^  Never had a reaction, father had a bad reaction       Current Outpatient Medications   Medication Instructions    aspirin (ECOTRIN) 81 mg, Oral, Daily    atorvastatin (LIPITOR) 20 mg, Oral, Daily    glipiZIDE (GLUCOTROL) 5 mg, Oral, 2 times daily with meals    JARDIANCE 25 mg, Oral, Daily    levothyroxine (SYNTHROID) 25 mcg, Oral, Daily, Repeat a TSH in my office in 2 months.    loratadine (CLARITIN) 10 mg, Oral    pantoprazole (PROTONIX) 40 mg, Oral, Daily    promethazine (PHENERGAN) 25 mg, Oral, Every 6 hours PRN    SURE COMFORT  "PEN NEEDLE 31 gauge x 3/16" Ndle No dose, route, or frequency recorded.    traMADoL (ULTRAM) 50 mg, Oral, Every 6 hours    VASCEPA 2 g, Oral, 2 times daily      I have reviewed the PMH, social history, FamilyHx, surgical history, allergies and medications documented / confirmed by the patient at the time of this visit.  Review of Systems   Constitutional: Positive for fatigue. Negative for chills, fever and unexpected weight change.   HENT: Negative for ear pain and sore throat.    Eyes: Negative for redness and visual disturbance.   Respiratory: Negative for cough and shortness of breath.    Cardiovascular: Negative for chest pain and palpitations.   Gastrointestinal: Positive for abdominal pain and nausea. Negative for vomiting.   Endocrine: Negative for cold intolerance and heat intolerance.   Genitourinary: Negative for difficulty urinating and hematuria.   Musculoskeletal: Positive for arthralgias and back pain. Negative for myalgias.   Skin: Negative for rash and wound.   Allergic/Immunologic: Negative for environmental allergies and food allergies.   Neurological: Negative for weakness and headaches.   Hematological: Negative for adenopathy. Does not bruise/bleed easily.   Psychiatric/Behavioral: Positive for dysphoric mood. Negative for sleep disturbance and suicidal ideas. The patient is not nervous/anxious.      Objective:   /88   Pulse 99   Temp 97.4 °F (36.3 °C) (Other (see comments))   Ht 5' 11" (1.803 m)   Wt 87.1 kg (192 lb)   SpO2 96%   BMI 26.78 kg/m²   Physical Exam  Vitals and nursing note reviewed.   Constitutional:       General: He is not in acute distress.     Appearance: He is well-developed and well-nourished. He is not diaphoretic.      Comments: WALKS WITH CANE   HENT:      Head: Normocephalic and atraumatic.      Right Ear: External ear normal.      Left Ear: External ear normal.      Nose: Nose normal. No rhinorrhea.   Eyes:      Extraocular Movements: Extraocular movements " intact and EOM normal.      Pupils: Pupils are equal, round, and reactive to light.   Cardiovascular:      Rate and Rhythm: Normal rate and regular rhythm.      Pulses: Normal pulses.           Dorsalis pedis pulses are 2+ on the right side and 2+ on the left side.      Heart sounds: Normal heart sounds. No murmur heard.      Pulmonary:      Effort: Pulmonary effort is normal. No respiratory distress.      Breath sounds: Normal breath sounds. No wheezing.   Abdominal:      General: There is no distension.      Palpations: There is no mass.      Tenderness: There is abdominal tenderness. There is no right CVA tenderness or left CVA tenderness.      Comments: Abdominal surgical scars noted clean dry intact   Musculoskeletal:         General: Tenderness and deformity present. Normal range of motion.      Cervical back: Normal range of motion and neck supple.      Right foot: Normal range of motion. No deformity.      Left foot: Normal range of motion. No deformity.   Feet:      Right foot:      Protective Sensation: 10 sites tested. 9 sites sensed.      Skin integrity: Callus and dry skin present. No ulcer, blister, skin breakdown, erythema or warmth.      Left foot:      Protective Sensation: 10 sites tested. 8 sites sensed.      Skin integrity: Callus and dry skin present. No ulcer, blister, skin breakdown, erythema or warmth.   Skin:     General: Skin is warm and dry.      Capillary Refill: Capillary refill takes less than 2 seconds.      Findings: No rash.   Neurological:      General: No focal deficit present.      Mental Status: He is alert and oriented to person, place, and time.      Cranial Nerves: No cranial nerve deficit.   Psychiatric:         Attention and Perception: He is attentive.         Mood and Affect: Mood is depressed. Mood is not anxious. Affect is not labile, blunt, angry or inappropriate.         Speech: He is communicative. Speech is not rapid and pressured, delayed, slurred or tangential.          Behavior: Behavior normal. Behavior is not agitated, slowed, aggressive, withdrawn, hyperactive, combative or actively hallucinating.         Thought Content: Thought content normal. Thought content is not paranoid or delusional. Thought content does not include homicidal or suicidal ideation. Thought content does not include homicidal or suicidal plan.         Cognition and Memory: Cognition and memory normal. Memory is not impaired.         Judgment: Judgment normal. Judgment is not impulsive or inappropriate.        Patient is wearing a mask which limits physical exam due to COVID restrictions.       Lab Results   Component Value Date    WBC 8.79 02/27/2020    HGB 16.6 02/27/2020    HCT 50.1 02/27/2020    MCV 93 02/27/2020     02/27/2020         Chemistry        Component Value Date/Time     02/02/2021 0907    K 4.1 02/02/2021 0907     02/02/2021 0907    CO2 19 (L) 02/02/2021 0907    BUN 14 02/02/2021 0907    CREATININE 0.7 02/02/2021 0907     (H) 02/02/2021 0907        Component Value Date/Time    CALCIUM 9.1 02/02/2021 0907    ALKPHOS 74 02/02/2021 0907    AST 17 02/02/2021 0907    ALT 14 02/02/2021 0907    BILITOT 0.7 02/02/2021 0907    ESTGFRAFRICA >60.0 02/02/2021 0907    EGFRNONAA >60.0 02/02/2021 0907          Lab Results   Component Value Date    TSH 12.667 (H) 02/02/2021    FREET4 0.73 02/02/2021       Assessment:     1. Type 2 diabetes mellitus with hyperglycemia, without long-term current use of insulin    2. Nausea    3. Depression, major, recurrent, moderate    4. Chronic abdominal pain    5. Chronic pain syndrome    6. Encounter for prostate cancer screening      MDM:   Moderate to high complexity.  Moderate risk.  Total time:54 minutes.  This includes total time spent on the encounter, which includes face to face time and non-face to face time preparing to see the patient (eg, review of previous medical records, tests), Obtaining and/or reviewing separately obtained  history, documenting clinical information in the electronic or other health record, independently interpreting results (not separately reported)/communicating results to the patient/family/caregiver, and/or care coordination (not separately reported).    I have Reviewed and summarized old records.  I have performed thorough medication reconciliation today and discussed risk and benefits of medications.  I have reviewed labs and discussed with patient.  All questions were answered.  I am requesting old records and will review them once they are available.    I have signed for the following orders AND/OR meds.  Orders Placed This Encounter   Procedures    PSA, Screening     Standing Status:   Standing     Number of Occurrences:   99     Standing Expiration Date:   2/6/2042   The natural history of prostate cancer and ongoing controversy regarding screening and potential treatment outcomes of prostate cancer has been discussed with the patient. The meaning of a false positive PSA and a false negative PSA has been discussed. He indicates understanding of the limitations of this screening test and wishes  to proceed with screening PSA testing.    Medications Ordered This Encounter   Medications    promethazine (PHENERGAN) 25 MG tablet     Sig: Take 1 tablet (25 mg total) by mouth every 6 (six) hours as needed for Nausea.     Dispense:  30 tablet     Refill:  12    traMADoL (ULTRAM) 50 mg tablet     Sig: Take 1 tablet (50 mg total) by mouth every 6 (six) hours.     Dispense:  28 tablet     Refill:  0     Quantity prescribed more than 7 day supply? No     Order Specific Question:   I have reviewed the Prescription Drug Monitoring Program (PDMP) database for this patient prior to prescribing the above opioid medication     Answer:   Yes        Follow up in about 6 months (around 8/11/2022), or if symptoms worsen or fail to improve, for DM, LAB RESULTS.    If no improvement in symptoms or symptoms worsen, advised to  call/follow-up at clinic or go to ER. Patient voiced understanding and all questions/concerns were addressed.   DISCLAIMER: This note was compiled by using a speech recognition dictation system and therefore please be aware that typographical / speech recognition errors can and do occur.  Please contact me if you see any errors specifically.    Donnie Shrestha M.D.       Office: [phone] [address] [place] [address]  FAX: [phone]

## 2022-02-11 NOTE — ASSESSMENT & PLAN NOTE
Patient was referred to Gastroenterology and appointment has been made.  Follow-up with Gastroenterology, General surgery.  ER precautions for severe pain.  Constipation precautions with his history of surgical intervention in the abdominal cavity.  Start tramadol for severe pain.

## 2022-02-11 NOTE — ASSESSMENT & PLAN NOTE
Treating pain.  See other problem.  Patient defers and refuses any antidepressants.Please be advised of condition course.  SNRI/SSRI is first-line treatment for this condition.  Please be advised of the risk of discontinuing this medication without tapering/contacting MD.  Patient has been advised of side effects, and all questions were answered.  Patient voiced understanding.  Patient will follow up routinely and notify us if having any side effects or worsening or persistent symptoms.  ER precautions were given. Antidepressant/Antianxiety Medication Initiation:  Patient informed of risks, benefits, and potential side effects of medication and accepts informed consent.  Common side effects include nausea, fatigue, headache, insomnia, etc see medication insert for complete side effect profile.  Most importantly be advised of the possibility of new or worsening suicidal thoughts/depression/anxiety etcetera.  Please be advised to stop the medication immediately and seek urgent treatment if this occurs.  Therefore please do not to abruptly discontinue medication without physician guidance except in cases of sudden onset or worsening of SI.

## 2022-02-15 ENCOUNTER — LAB VISIT (OUTPATIENT)
Dept: LAB | Facility: HOSPITAL | Age: 52
End: 2022-02-15
Attending: FAMILY MEDICINE
Payer: MEDICARE

## 2022-02-15 DIAGNOSIS — Z12.5 ENCOUNTER FOR PROSTATE CANCER SCREENING: ICD-10-CM

## 2022-02-15 DIAGNOSIS — Z13.6 ENCOUNTER FOR LIPID SCREENING FOR CARDIOVASCULAR DISEASE: ICD-10-CM

## 2022-02-15 DIAGNOSIS — Z00.01 ENCOUNTER FOR GENERAL ADULT MEDICAL EXAMINATION WITH ABNORMAL FINDINGS: ICD-10-CM

## 2022-02-15 DIAGNOSIS — Z13.220 ENCOUNTER FOR LIPID SCREENING FOR CARDIOVASCULAR DISEASE: ICD-10-CM

## 2022-02-15 DIAGNOSIS — Z79.899 ENCOUNTER FOR LONG-TERM (CURRENT) USE OF MEDICATIONS: ICD-10-CM

## 2022-02-15 LAB
ALBUMIN SERPL BCP-MCNC: 4.3 G/DL (ref 3.5–5.2)
ALP SERPL-CCNC: 75 U/L (ref 55–135)
ALT SERPL W/O P-5'-P-CCNC: 27 U/L (ref 10–44)
ANION GAP SERPL CALC-SCNC: 12 MMOL/L (ref 8–16)
AST SERPL-CCNC: 14 U/L (ref 10–40)
BILIRUB SERPL-MCNC: 0.4 MG/DL (ref 0.1–1)
BUN SERPL-MCNC: 17 MG/DL (ref 6–20)
CALCIUM SERPL-MCNC: 9.9 MG/DL (ref 8.7–10.5)
CHLORIDE SERPL-SCNC: 101 MMOL/L (ref 95–110)
CHOLEST SERPL-MCNC: 231 MG/DL (ref 120–199)
CHOLEST/HDLC SERPL: 6.6 {RATIO} (ref 2–5)
CO2 SERPL-SCNC: 23 MMOL/L (ref 23–29)
COMPLEXED PSA SERPL-MCNC: 0.21 NG/ML (ref 0–4)
CREAT SERPL-MCNC: 0.9 MG/DL (ref 0.5–1.4)
ERYTHROCYTE [DISTWIDTH] IN BLOOD BY AUTOMATED COUNT: 12.5 % (ref 11.5–14.5)
EST. GFR  (AFRICAN AMERICAN): >60 ML/MIN/1.73 M^2
EST. GFR  (NON AFRICAN AMERICAN): >60 ML/MIN/1.73 M^2
ESTIMATED AVG GLUCOSE: 189 MG/DL (ref 68–131)
GLUCOSE SERPL-MCNC: 173 MG/DL (ref 70–110)
HBA1C MFR BLD: 8.2 % (ref 4–5.6)
HCT VFR BLD AUTO: 49 % (ref 40–54)
HDLC SERPL-MCNC: 35 MG/DL (ref 40–75)
HDLC SERPL: 15.2 % (ref 20–50)
HGB BLD-MCNC: 16.5 G/DL (ref 14–18)
LDLC SERPL CALC-MCNC: 124.8 MG/DL (ref 63–159)
MCH RBC QN AUTO: 30.2 PG (ref 27–31)
MCHC RBC AUTO-ENTMCNC: 33.7 G/DL (ref 32–36)
MCV RBC AUTO: 90 FL (ref 82–98)
NONHDLC SERPL-MCNC: 196 MG/DL
PLATELET # BLD AUTO: 211 K/UL (ref 150–450)
PMV BLD AUTO: 11.7 FL (ref 9.2–12.9)
POTASSIUM SERPL-SCNC: 5 MMOL/L (ref 3.5–5.1)
PROT SERPL-MCNC: 7.8 G/DL (ref 6–8.4)
RBC # BLD AUTO: 5.47 M/UL (ref 4.6–6.2)
SODIUM SERPL-SCNC: 136 MMOL/L (ref 136–145)
T4 FREE SERPL-MCNC: 0.7 NG/DL (ref 0.71–1.51)
TRIGL SERPL-MCNC: 356 MG/DL (ref 30–150)
TSH SERPL DL<=0.005 MIU/L-ACNC: 18.39 UIU/ML (ref 0.4–4)
WBC # BLD AUTO: 8.24 K/UL (ref 3.9–12.7)

## 2022-02-15 PROCEDURE — 80061 LIPID PANEL: CPT | Mod: HCNC | Performed by: FAMILY MEDICINE

## 2022-02-15 PROCEDURE — 80053 COMPREHEN METABOLIC PANEL: CPT | Mod: HCNC | Performed by: FAMILY MEDICINE

## 2022-02-15 PROCEDURE — 83036 HEMOGLOBIN GLYCOSYLATED A1C: CPT | Mod: HCNC | Performed by: FAMILY MEDICINE

## 2022-02-15 PROCEDURE — 85027 COMPLETE CBC AUTOMATED: CPT | Mod: HCNC,PO | Performed by: FAMILY MEDICINE

## 2022-02-15 PROCEDURE — 36415 COLL VENOUS BLD VENIPUNCTURE: CPT | Mod: HCNC,PO | Performed by: FAMILY MEDICINE

## 2022-02-15 PROCEDURE — 84443 ASSAY THYROID STIM HORMONE: CPT | Mod: HCNC | Performed by: FAMILY MEDICINE

## 2022-02-15 PROCEDURE — 84153 ASSAY OF PSA TOTAL: CPT | Mod: HCNC | Performed by: FAMILY MEDICINE

## 2022-02-15 PROCEDURE — 84439 ASSAY OF FREE THYROXINE: CPT | Mod: HCNC | Performed by: FAMILY MEDICINE

## 2022-02-16 DIAGNOSIS — E11.9 TYPE 2 DIABETES MELLITUS WITHOUT COMPLICATION: ICD-10-CM

## 2022-02-17 DIAGNOSIS — E11.42 TYPE 2 DIABETES MELLITUS WITH DIABETIC POLYNEUROPATHY, WITHOUT LONG-TERM CURRENT USE OF INSULIN: Chronic | ICD-10-CM

## 2022-02-17 DIAGNOSIS — Z79.899 ENCOUNTER FOR LONG-TERM (CURRENT) USE OF MEDICATIONS: ICD-10-CM

## 2022-02-17 DIAGNOSIS — E78.5 HYPERLIPIDEMIA LDL GOAL <130: ICD-10-CM

## 2022-02-17 DIAGNOSIS — E03.9 ACQUIRED HYPOTHYROIDISM: Primary | ICD-10-CM

## 2022-02-17 RX ORDER — ATORVASTATIN CALCIUM 20 MG/1
20 TABLET, FILM COATED ORAL DAILY
Qty: 90 TABLET | Refills: 4 | Status: SHIPPED | OUTPATIENT
Start: 2022-02-17 | End: 2022-05-12

## 2022-02-17 RX ORDER — ICOSAPENT ETHYL 1000 MG/1
2 CAPSULE ORAL 2 TIMES DAILY
Qty: 120 CAPSULE | Refills: 4 | Status: SHIPPED | OUTPATIENT
Start: 2022-02-17

## 2022-02-17 RX ORDER — GLIPIZIDE 10 MG/1
10 TABLET ORAL 2 TIMES DAILY WITH MEALS
Qty: 180 TABLET | Refills: 4 | Status: SHIPPED | OUTPATIENT
Start: 2022-02-17 | End: 2023-05-13

## 2022-02-17 RX ORDER — LIOTHYRONINE SODIUM 25 UG/1
25 TABLET ORAL DAILY
Qty: 90 TABLET | Refills: 3 | Status: SHIPPED | OUTPATIENT
Start: 2022-02-17 | End: 2023-02-17

## 2022-02-24 DIAGNOSIS — G89.29 CHRONIC ABDOMINAL PAIN: ICD-10-CM

## 2022-02-24 DIAGNOSIS — G89.4 CHRONIC PAIN SYNDROME: ICD-10-CM

## 2022-02-24 DIAGNOSIS — R10.9 CHRONIC ABDOMINAL PAIN: ICD-10-CM

## 2022-02-24 RX ORDER — TRAMADOL HYDROCHLORIDE 50 MG/1
50 TABLET ORAL EVERY 12 HOURS PRN
Qty: 60 TABLET | Refills: 0 | Status: SHIPPED | OUTPATIENT
Start: 2022-02-24 | End: 2022-04-14

## 2022-02-24 NOTE — TELEPHONE ENCOUNTER
No new care gaps identified.  Powered by [employer]. Reference number: [id number].   2/24/2022 2:52:41 PM CST

## 2022-02-24 NOTE — TELEPHONE ENCOUNTER
----- Message from Lynda Xiaoamador sent at 2/24/2022  2:47 PM CST -----  Contact: Self   Requesting an RX refill or new RX.  Is this a refill or new RX:   RX name and strength (copy/paste from chart):  traMADoL (ULTRAM) 50 mg tablet  Is this a 30 day or 90 day RX:   Pharmacy name and phone # (copy/paste from chart):    [facility] [address] [address]  Phone: [phone] Fax: [phone]     The doctors have asked that we provide their patients with the following 2 reminders -- prescription refills can take up to 72 hours, and a friendly reminder that in the future you can use your [facility] account to request refills: yes         Requesting an RX refill or new RX.  Is this a refill or new RX:   RX name and strength (copy/paste from chart):  levothyroxine (SYNTHROID) 25 MCG tablet   Is this a 30 day or 90 day RX:   Pharmacy name and phone # (copy/paste from chart):    [facility] [address] [address]  Phone: [phone] Fax: [phone]     The doctors have asked that we provide their patients with the following 2 reminders -- prescription refills can take up to 72 hours, and a friendly reminder that in the future you can use your [facility] account to request refills: yes

## 2022-02-28 ENCOUNTER — TELEPHONE (OUTPATIENT)
Dept: GASTROENTEROLOGY | Facility: CLINIC | Age: 52
End: 2022-02-28
Payer: MEDICARE

## 2022-02-28 NOTE — TELEPHONE ENCOUNTER
Attempted to reach the patient per Dr. Rene's recommendation to reach out to the patient and have the patient go to [place] and get a copy of his Ct scans and bring them to his appointment on Thursday, unable to leave a message as the phone only rings.

## 2022-03-02 ENCOUNTER — TELEPHONE (OUTPATIENT)
Dept: GASTROENTEROLOGY | Facility: CLINIC | Age: 52
End: 2022-03-02
Payer: MEDICARE

## 2022-03-02 NOTE — TELEPHONE ENCOUNTER
Attempted to reach the patient by phone numers listed for him, one only rang and the other was not in service at this time, left message of number of [employer] asking that they bring a copy of the Ct Scan on a disk from [facility] to his upcoming appointment per Dr. Rene's request, clinic number provided.

## 2022-04-13 DIAGNOSIS — R11.0 NAUSEA: ICD-10-CM

## 2022-04-13 RX ORDER — PROMETHAZINE HYDROCHLORIDE 25 MG/1
25 TABLET ORAL EVERY 6 HOURS PRN
Qty: 30 TABLET | Refills: 12 | Status: SHIPPED | OUTPATIENT
Start: 2022-04-13

## 2022-04-13 NOTE — TELEPHONE ENCOUNTER
----- Message from Christopher AVELINA Camposan sent at 4/13/2022  3:21 PM CDT -----  Contact: KIKA MYERS [[id number]]  .Type:  RX Refill Request    Who Called:KIKA MYERS [[id number]]  Refill or New Rx: refill   RX Name and Strength: PHENERGAN  25 MG   How is the patient currently taking it? (ex. 1XDay): 1 every  6 hours  as needed  Is this a 30 day or 90 day RX:   Preferred Pharmacy with phone number      [address] [address]  Phone: [phone] Fax: [phone]     Local or Mail Order:  local    Ordering Provider: Donnie Shrestha MD  Would the patient rather a call back or a response via [facility]?   Call    Best Call Back Number:  [phone] (home)   Additional Information:            .Type:  RX Refill Request    Who Called:KIKA MYERS [[id number]]  Refill or New Rx: refill   RX Name and Strength: traMADoL  50 mg   How is the patient currently taking it? (ex. 1XDay): 1 every  12 hours  as needed  Is this a 30 day or 90 day RX:   Preferred Pharmacy with phone number      [address] [address]  Phone: [phone] Fax: [phone]     Local or Mail Order:  local    Ordering Provider: Donnie Shrestha MD  Would the patient rather a call back or a response via My [facility]?   Call    Best Call Back Number:  [phone] (home)   Additional Information:

## 2022-04-13 NOTE — TELEPHONE ENCOUNTER
No new care gaps identified.  Powered by [employer]. Reference number: [id number].   4/13/2022 3:43:02 PM CDT

## 2022-04-14 DIAGNOSIS — R10.9 CHRONIC ABDOMINAL PAIN: ICD-10-CM

## 2022-04-14 DIAGNOSIS — G89.4 CHRONIC PAIN SYNDROME: ICD-10-CM

## 2022-04-14 DIAGNOSIS — G89.29 CHRONIC ABDOMINAL PAIN: ICD-10-CM

## 2022-04-14 RX ORDER — TRAMADOL HYDROCHLORIDE 50 MG/1
TABLET ORAL
Qty: 60 TABLET | Refills: 0 | Status: SHIPPED | OUTPATIENT
Start: 2022-04-14 | End: 2022-05-12

## 2022-04-14 NOTE — TELEPHONE ENCOUNTER
No new care gaps identified.  Powered by [employer]. Reference number: [id number].   4/14/2022 2:10:27 PM CDT

## 2022-05-11 ENCOUNTER — LAB VISIT (OUTPATIENT)
Dept: LAB | Facility: HOSPITAL | Age: 52
End: 2022-05-11
Attending: FAMILY MEDICINE
Payer: MEDICARE

## 2022-05-11 ENCOUNTER — OFFICE VISIT (OUTPATIENT)
Dept: FAMILY MEDICINE | Facility: CLINIC | Age: 52
End: 2022-05-11
Payer: MEDICARE

## 2022-05-11 VITALS
OXYGEN SATURATION: 97 % | SYSTOLIC BLOOD PRESSURE: 114 MMHG | HEART RATE: 100 BPM | BODY MASS INDEX: 27.86 KG/M2 | TEMPERATURE: 97 F | HEIGHT: 71 IN | DIASTOLIC BLOOD PRESSURE: 76 MMHG | WEIGHT: 199 LBS

## 2022-05-11 DIAGNOSIS — E03.9 ACQUIRED HYPOTHYROIDISM: ICD-10-CM

## 2022-05-11 DIAGNOSIS — R10.9 CHRONIC ABDOMINAL PAIN: Chronic | ICD-10-CM

## 2022-05-11 DIAGNOSIS — E03.9 ACQUIRED HYPOTHYROIDISM: Chronic | ICD-10-CM

## 2022-05-11 DIAGNOSIS — G89.4 CHRONIC PAIN SYNDROME: Chronic | ICD-10-CM

## 2022-05-11 DIAGNOSIS — G89.4 CHRONIC PAIN SYNDROME: Primary | ICD-10-CM

## 2022-05-11 DIAGNOSIS — G89.29 CHRONIC ABDOMINAL PAIN: ICD-10-CM

## 2022-05-11 DIAGNOSIS — Z00.01 ENCOUNTER FOR GENERAL ADULT MEDICAL EXAMINATION WITH ABNORMAL FINDINGS: ICD-10-CM

## 2022-05-11 DIAGNOSIS — Z79.899 ENCOUNTER FOR LONG-TERM (CURRENT) USE OF MEDICATIONS: ICD-10-CM

## 2022-05-11 DIAGNOSIS — G89.29 CHRONIC ABDOMINAL PAIN: Chronic | ICD-10-CM

## 2022-05-11 DIAGNOSIS — E78.5 HYPERLIPIDEMIA LDL GOAL <130: Chronic | ICD-10-CM

## 2022-05-11 DIAGNOSIS — R10.9 CHRONIC ABDOMINAL PAIN: ICD-10-CM

## 2022-05-11 DIAGNOSIS — E11.65 TYPE 2 DIABETES MELLITUS WITH HYPERGLYCEMIA, WITHOUT LONG-TERM CURRENT USE OF INSULIN: Primary | Chronic | ICD-10-CM

## 2022-05-11 DIAGNOSIS — Z79.899 ENCOUNTER FOR LONG-TERM (CURRENT) USE OF MEDICATIONS: Chronic | ICD-10-CM

## 2022-05-11 PROCEDURE — 99999 PR PBB SHADOW E&M-EST. PATIENT-LVL IV: ICD-10-PCS | Mod: PBBFAC,,, | Performed by: NURSE PRACTITIONER

## 2022-05-11 PROCEDURE — 3078F PR MOST RECENT DIASTOLIC BLOOD PRESSURE < 80 MM HG: ICD-10-PCS | Mod: CPTII,S$GLB,, | Performed by: NURSE PRACTITIONER

## 2022-05-11 PROCEDURE — 3008F PR BODY MASS INDEX (BMI) DOCUMENTED: ICD-10-PCS | Mod: CPTII,S$GLB,, | Performed by: NURSE PRACTITIONER

## 2022-05-11 PROCEDURE — 1159F MED LIST DOCD IN RCRD: CPT | Mod: CPTII,S$GLB,, | Performed by: NURSE PRACTITIONER

## 2022-05-11 PROCEDURE — 3078F DIAST BP <80 MM HG: CPT | Mod: CPTII,S$GLB,, | Performed by: NURSE PRACTITIONER

## 2022-05-11 PROCEDURE — 3008F BODY MASS INDEX DOCD: CPT | Mod: CPTII,S$GLB,, | Performed by: NURSE PRACTITIONER

## 2022-05-11 PROCEDURE — 3052F PR MOST RECENT HEMOGLOBIN A1C LEVEL 8.0 - < 9.0%: ICD-10-PCS | Mod: CPTII,S$GLB,, | Performed by: NURSE PRACTITIONER

## 2022-05-11 PROCEDURE — 36415 COLL VENOUS BLD VENIPUNCTURE: CPT | Mod: PO | Performed by: FAMILY MEDICINE

## 2022-05-11 PROCEDURE — 99214 PR OFFICE/OUTPT VISIT, EST, LEVL IV, 30-39 MIN: ICD-10-PCS | Mod: S$GLB,,, | Performed by: NURSE PRACTITIONER

## 2022-05-11 PROCEDURE — 84439 ASSAY OF FREE THYROXINE: CPT | Performed by: FAMILY MEDICINE

## 2022-05-11 PROCEDURE — 3074F PR MOST RECENT SYSTOLIC BLOOD PRESSURE < 130 MM HG: ICD-10-PCS | Mod: CPTII,S$GLB,, | Performed by: NURSE PRACTITIONER

## 2022-05-11 PROCEDURE — 84480 ASSAY TRIIODOTHYRONINE (T3): CPT | Performed by: FAMILY MEDICINE

## 2022-05-11 PROCEDURE — 83036 HEMOGLOBIN GLYCOSYLATED A1C: CPT | Performed by: FAMILY MEDICINE

## 2022-05-11 PROCEDURE — 1159F PR MEDICATION LIST DOCUMENTED IN MEDICAL RECORD: ICD-10-PCS | Mod: CPTII,S$GLB,, | Performed by: NURSE PRACTITIONER

## 2022-05-11 PROCEDURE — 3074F SYST BP LT 130 MM HG: CPT | Mod: CPTII,S$GLB,, | Performed by: NURSE PRACTITIONER

## 2022-05-11 PROCEDURE — 99999 PR PBB SHADOW E&M-EST. PATIENT-LVL IV: CPT | Mod: PBBFAC,,, | Performed by: NURSE PRACTITIONER

## 2022-05-11 PROCEDURE — 3052F HG A1C>EQUAL 8.0%<EQUAL 9.0%: CPT | Mod: CPTII,S$GLB,, | Performed by: NURSE PRACTITIONER

## 2022-05-11 PROCEDURE — 99214 OFFICE O/P EST MOD 30 MIN: CPT | Mod: S$GLB,,, | Performed by: NURSE PRACTITIONER

## 2022-05-11 NOTE — ASSESSMENT & PLAN NOTE
-chronic condition. Currently stable.    -reports compliance with hyperlipidemia treatment as prescribed  -denies any known adverse effects of medications  -recent labs listed below:  Lab Results   Component Value Date    CHOL 231 (H) 02/15/2022     Lab Results   Component Value Date    HDL 35 (L) 02/15/2022     Lab Results   Component Value Date    LDLCALC 124.8 02/15/2022     Lab Results   Component Value Date    TRIG 356 (H) 02/15/2022     Lab Results   Component Value Date    ALT 27 02/15/2022    AST 14 02/15/2022    ALKPHOS 75 02/15/2022    BILITOT 0.4 02/15/2022     Hyperlipidemia Medications             atorvastatin (LIPITOR) 20 MG tablet Take 1 tablet (20 mg total) by mouth once daily.    icosapent ethyL (VASCEPA) 1 gram Cap Take 2 capsules (2 g total) by mouth 2 (two) times daily.

## 2022-05-11 NOTE — ASSESSMENT & PLAN NOTE
Patient was referred to Gastroenterology and he did not show up for appt March 2022. I recommend patient follow-up with GI and General surgery.  ER precautions for severe pain.  Constipation precautions with his history of surgical intervention in the abdominal cavity. He reports started Tramadol for pain and states medication is no longer effective, requesting something stronger. Notified patient I would update his PCP. Also recommend close follow up with pain management.

## 2022-05-11 NOTE — PROGRESS NOTES
Patient reports tramadol is no longer effective at controlling pain. He is requesting something stronger for chronic abdominal pain. Patient is established with general surgery and pain management. He reports CBD was recommended but he cannot afford it. He was also previously referred to GI and missed his appt that was scheduled March 2022. Patient was instructed to follow up with GI and pain management. Please refer to encounter for further details. Notified patient I would update his PCP. Please advise any other recommendations.

## 2022-05-11 NOTE — PROGRESS NOTES
Assessment/Plan:  Problem List Items Addressed This Visit        Neuro    Chronic pain syndrome (Chronic)    Overview     CHRONIC.  UNCONTROLLED.  PATIENT IS COMPLAINING OF POLYNEUROPATHY DUE TO DIABETES.  PATIENT HAS TRIED GABAPENTIN WITH SIDE EFFECTS.  PATIENT HAS BEEN TO PAIN MANAGEMENT WHO IS RECOMMENDING CBD BUT PATIENT CANNOT AFFORD THIS.    February 2022:  Patient reports he continues to be in severe pain.  Patient is now having uncontrollable abdominal pain after recent surgery.           Current Assessment & Plan     - Chronic, uncontrolled  - Complains of ongoing abdominal pain  - He has been referred to pain management, Dr. Aguirre. Patient states he recommended CBD which his insurance will not cover.   - He has tried taking Tramadol as prescribed by PCP but today reports medication is no longer effective at controlling pain. He is requesting something stronger for pain. Notified patient that I would update his PCP. Also recommend close follow up with pain management.               Cardiac/Vascular    Hyperlipidemia LDL goal <130 (Chronic)    Overview     CHRONIC.  Uncontrolled. Lab analysis reviewed.   October 2020:  Patient reports that he has been noncompliant with medication.  He is not fasting this morning for  February 2020:  Patient is on no medications currently.  Patient reports eating a very well controlled diet.    Lab Results   Component Value Date    CHOL 212 (H) 02/27/2020     Lab Results   Component Value Date    HDL 37 (L) 02/27/2020     Lab Results   Component Value Date    LDLCALC 123.4 02/27/2020     Lab Results   Component Value Date    TRIG 258 (H) 02/27/2020     Lab Results   Component Value Date    CHOLHDL 17.5 (L) 02/27/2020     Lab Results   Component Value Date    TOTALCHOLEST 5.7 (H) 02/27/2020     Lab Results   Component Value Date    ALT 23 02/27/2020    AST 21 02/27/2020    ALKPHOS 67 02/27/2020    BILITOT 0.3 02/27/2020      ======================================================             Current Assessment & Plan     -chronic condition. Currently stable.    -reports compliance with hyperlipidemia treatment as prescribed  -denies any known adverse effects of medications  -recent labs listed below:  Lab Results   Component Value Date    CHOL 231 (H) 02/15/2022     Lab Results   Component Value Date    HDL 35 (L) 02/15/2022     Lab Results   Component Value Date    LDLCALC 124.8 02/15/2022     Lab Results   Component Value Date    TRIG 356 (H) 02/15/2022     Lab Results   Component Value Date    ALT 27 02/15/2022    AST 14 02/15/2022    ALKPHOS 75 02/15/2022    BILITOT 0.4 02/15/2022     Hyperlipidemia Medications             atorvastatin (LIPITOR) 20 MG tablet Take 1 tablet (20 mg total) by mouth once daily.    icosapent ethyL (VASCEPA) 1 gram Cap Take 2 capsules (2 g total) by mouth 2 (two) times daily.                    Endocrine    Hypothyroidism (Chronic)    Overview     Lab Results   Component Value Date    TSH 16.042 (H) 02/27/2020    FREET4 0.71 02/27/2020 October 2020:  Patient did not  the levothyroxine and has not taken this medication.           Current Assessment & Plan     - chronic, uncontrolled  - taking liothyonine 25 mcg daily, reports was previously noncompliant with medication. He states since last labs in February 2022 he has been taking as prescribed  - repeat labs today     Lab Results   Component Value Date    TSH 18.393 (H) 02/15/2022              Type 2 diabetes mellitus with hyperglycemia, without long-term current use of insulin - Primary (Chronic)    Overview     Diabetes Management Status    Statin: Taking  ACE/ARB: Not taking    Screening or Prevention Patient's value Goal Complete/Controlled?   HgA1C Testing and Control   Lab Results   Component Value Date    HGBA1C 6.2 (H) 02/02/2021      Annually/Less than 8% No   Lipid profile : 02/27/2020 Annually No   LDL control Lab Results    Component Value Date    LDLCALC 123.4 02/27/2020    Annually/Less than 100 mg/dl  No   Nephropathy screening Lab Results   Component Value Date    LABMICR 106.0 02/27/2020     Lab Results   Component Value Date    PROTEINUA Negative 10/30/2020     No results found for: UTPCR   Annually No   Blood pressure BP Readings from Last 1 Encounters:   02/11/22 138/88    Less than 140/90 Yes   Dilated retinal exam : 10/19/2021 Annually Yes   Foot exam   : 02/11/2021 Annually Yes     Patient reports compliance with medications.  Patient is due for labs.           Current Assessment & Plan     -condition is currently uncontrolled  -plan to repeat labs today   -see diabetic health maintenance listed below  -on statin: Yes  -on ACE-I/ARB: No  -counseling provided on importance of diabetic diet and medication compliance in order to treat diabetes  -discussed diabetes disease course and potential complications    Diabetes Medications             empagliflozin (JARDIANCE) 25 mg tablet Take 1 tablet (25 mg total) by mouth once daily.    glipiZIDE (GLUCOTROL) 10 MG tablet Take 1 tablet (10 mg total) by mouth 2 (two) times daily with meals.        Lab Results   Component Value Date    HGBA1C 8.2 (H) 02/15/2022                 GI    Chronic abdominal pain (Chronic)    Overview     Chronic.  Recurrent.  Worsened after recent surgery which he supposedly had intussusception.  Patient reports that the surgeon reported that it had resolved on its own once the surgery was performed.    Reviewed last note from general surgeon:  Mayra Ervin NP - 12/08/2021 12:30 PM CST  Formatting of this note might be different from the original.  The patient is in today for post operative appointment following diagnostic laparoscopy on 11/29/2021 with Dr. Gaspar for abnormal imaging suggestive of intussusception. Has been unable to schedule f/u apt with GI d/t referral issues. Patient reports blistering of skin from dressing he placed after surgery.  "When asked about pain that he was experiencing prior to surgery - he reports pain in his abdomen is chronic and has been occurring for years after his previous surgery in 2015 (colectomy for diverticulitis).    Incisions - healing well without evidence of infection. No erythema or drainage. Blistering of skin surrounding incision c/w adhesive reaction.    Spoke to Dr. Vazquez's office, pt's insurance is out of network.   All post operative instructions reviewed with patient. Verbalizes understanding.   Follow up PRN        Electronically signed by Mayra Ervin NP at 12/08/2021 12:41 PM CST      "           Current Assessment & Plan     Patient was referred to Gastroenterology and he did not show up for appt March 2022. I recommend patient follow-up with GI and General surgery.  ER precautions for severe pain.  Constipation precautions with his history of surgical intervention in the abdominal cavity. He reports started Tramadol for pain and states medication is no longer effective, requesting something stronger. Notified patient I would update his PCP. Also recommend close follow up with pain management.                Other    Encounter for long-term (current) use of medications (Chronic)    Overview     February 2020:  CHRONIC. Stable. Compliant with medications for managed conditions. See medication list. No SE reported.   Routine lab analysis is being monitored. Refills were addressed.  ==================================================  OCTOBER 2020:    CHRONIC. Stable. Compliant with medications for managed conditions. See medication list. No SE reported.   Routine lab analysis is being monitored. Refills were addressed.  Lab Results   Component Value Date    WBC 8.79 02/27/2020    HGB 16.6 02/27/2020    HCT 50.1 02/27/2020    MCV 93 02/27/2020     02/27/2020         Chemistry        Component Value Date/Time     02/27/2020 0828    K 4.5 02/27/2020 0828     02/27/2020 0828    CO2 20 (L) " 02/27/2020 0828    BUN 11 02/27/2020 0828    CREATININE 0.9 02/27/2020 0828     (H) 02/27/2020 0828        Component Value Date/Time    CALCIUM 9.5 02/27/2020 0828    ALKPHOS 67 02/27/2020 0828    AST 21 02/27/2020 0828    ALT 23 02/27/2020 0828    BILITOT 0.3 02/27/2020 0828    ESTGFRAFRICA >60.0 02/27/2020 0828    EGFRNONAA >60.0 02/27/2020 0828          Lab Results   Component Value Date    TSH 16.042 (H) 02/27/2020    FREET4 0.71 02/27/2020       =================================================           Current Assessment & Plan     Reviewed recent labs with patient. Hypothyroidism and diabetes poorly controlled. Recommend update labs today. Complete history and physical was completed today.  Complete and thorough medication reconciliation was performed.  Discussed risks and benefits of medications.  Advised patient on orders and health maintenance.  Continue current medications listed on your summary sheet.                 Follow up in about 3 months (around 8/11/2022), or if symptoms worsen or fail to improve, for Follow up with Dr. Shrestha.    Kristina Posey NP  _____________________________________________________________________________________________________________________________________________________    CC: follow up     HPI: Patient is a 51-year-old male who presents in clinic today as an established patient here for follow up. Chronic condition has been reviewed. Further detail as stated above.     Hypothyroidism: Patient presents for evaluation of thyroid function. He complains of chronic fatigue. Denies weight changes, heat/cold intolerance, bowel/skin changes or CVS symptoms. Symptoms have present for several months. The problem has been gradually worsening.  Previous thyroid studies include TSH and total T4. The hypothyroidism is due to hypothyroidism.    Hyperlipidemia: This is a chronic problem. The current episode started more than 1 year ago. The problem is controlled. Recent  lipid tests were reviewed and are variable. Pertinent negatives include no chest pain or shortness of breath. Current antihyperlipidemic treatment includes statins. The current treatment provides moderate improvement of lipids. There are no compliance problems.      DM2: Patient presents for follow up of diabetes. Condition is chronic and uncontrolled. He reports poor compliance with diabetic diet. Patient denies symptoms, including foot ulcerations, hypoglycemia , nausea, paresthesia of the feet, polydipsia, polyuria and visual disturbances.  Evaluation to date has been included: fasting blood sugar, fasting lipid panel, hemoglobin A1C and microalbuminuria. Denies adverse effects of medications.     Statin: Taking  ACE/ARB: Not taking    Screening or Prevention Patient's value Goal Complete/Controlled?   HgA1C Testing and Control   Lab Results   Component Value Date    HGBA1C 8.2 (H) 02/15/2022      Annually/Less than 8% No   Lipid profile : 02/15/2022 Annually Yes   LDL control Lab Results   Component Value Date    LDLCALC 124.8 02/15/2022    Annually/Less than 100 mg/dl  No   Nephropathy screening Lab Results   Component Value Date    LABMICR 106.0 02/27/2020     Lab Results   Component Value Date    PROTEINUA Negative 10/30/2020    Annually No   Blood pressure BP Readings from Last 1 Encounters:   05/11/22 114/76    Less than 140/90 Yes   Dilated retinal exam : 03/08/2022 Annually Yes   Foot exam   : 02/11/2022 Annually Yes     Past Medical History:  Past Medical History:   Diagnosis Date    Anxiety     Arthritis     Cholelithiasis     Chronic pain     Chronic pain of both knees     Diabetes mellitus, type 2     Hyperlipidemia     Nephrolithiasis      Past Surgical History:   Procedure Laterality Date    CHOLECYSTECTOMY      COLON SURGERY      KIDNEY SURGERY       Review of patient's allergies indicates:   Allergen Reactions    Metformin Diarrhea    Gabapentin Other (See Comments)     Shoulder pain  "   Codeine      Unknown^  Unknown^      Penicillins Other (See Comments)     Anaphylaxis^  Anaphylaxis^  Never had a reaction, father had a bad reaction       Social History     Tobacco Use    Smoking status: Never Smoker    Smokeless tobacco: Never Used   Substance Use Topics    Alcohol use: Never    Drug use: Never     Family History   Problem Relation Age of Onset    Cancer Mother     COPD Father      Current Outpatient Medications on File Prior to Visit   Medication Sig Dispense Refill    aspirin (ECOTRIN) 81 MG EC tablet Take 1 tablet (81 mg total) by mouth once daily. 90 tablet 4    atorvastatin (LIPITOR) 20 MG tablet Take 1 tablet (20 mg total) by mouth once daily. 90 tablet 4    empagliflozin (JARDIANCE) 25 mg tablet Take 1 tablet (25 mg total) by mouth once daily. 90 tablet 4    glipiZIDE (GLUCOTROL) 10 MG tablet Take 1 tablet (10 mg total) by mouth 2 (two) times daily with meals. 180 tablet 4    icosapent ethyL (VASCEPA) 1 gram Cap Take 2 capsules (2 g total) by mouth 2 (two) times daily. 120 capsule 4    liothyronine (CYTOMEL) 25 MCG Tab Take 1 tablet (25 mcg total) by mouth once daily. 90 tablet 3    pantoprazole (PROTONIX) 40 MG tablet Take 1 tablet (40 mg total) by mouth once daily. 90 tablet 4    promethazine (PHENERGAN) 25 MG tablet Take 1 tablet (25 mg total) by mouth every 6 (six) hours as needed for Nausea. 30 tablet 12    SURE COMFORT PEN NEEDLE 31 gauge x 3/16" Ndle       loratadine (CLARITIN) 10 mg tablet Take 10 mg by mouth.      traMADoL (ULTRAM) 50 mg tablet TAKE ONE TABLET EVERY TWELVE HOURS FOR PAIN (Patient not taking: Reported on 5/11/2022) 60 tablet 0     No current facility-administered medications on file prior to visit.     Review of Systems   Constitutional: Positive for fatigue. Negative for chills, fever and unexpected weight change.   HENT: Negative for ear pain and sore throat.    Eyes: Negative for redness and visual disturbance.   Respiratory: Negative for " "cough and shortness of breath.    Cardiovascular: Negative for chest pain and palpitations.   Gastrointestinal: Positive for abdominal pain. Negative for nausea and vomiting.   Endocrine: Negative for cold intolerance and heat intolerance.   Genitourinary: Negative for difficulty urinating and hematuria.   Musculoskeletal: Positive for arthralgias and back pain. Negative for myalgias.   Skin: Negative for rash and wound.   Allergic/Immunologic: Negative for environmental allergies and food allergies.   Neurological: Negative for weakness and headaches.   Hematological: Negative for adenopathy. Does not bruise/bleed easily.   Psychiatric/Behavioral: Positive for dysphoric mood. Negative for sleep disturbance and suicidal ideas. The patient is not nervous/anxious.      Vitals:    05/11/22 1320   BP: 114/76   Pulse: 100   Temp: 96.7 °F (35.9 °C)   TempSrc: Other (see comments)   SpO2: 97%   Weight: 90.3 kg (199 lb)   Height: 5' 11" (1.803 m)     Wt Readings from Last 3 Encounters:   05/11/22 90.3 kg (199 lb)   02/11/22 87.1 kg (192 lb)   12/03/21 85.7 kg (189 lb)     Physical Exam  Vitals reviewed.   Constitutional:       General: He is not in acute distress.     Appearance: Normal appearance. He is not ill-appearing.   HENT:      Head: Normocephalic and atraumatic.      Right Ear: External ear normal.      Left Ear: External ear normal.   Eyes:      Extraocular Movements: Extraocular movements intact.      Conjunctiva/sclera: Conjunctivae normal.   Cardiovascular:      Rate and Rhythm: Normal rate.      Heart sounds: Normal heart sounds.   Pulmonary:      Effort: Pulmonary effort is normal. No respiratory distress.      Breath sounds: Normal breath sounds.   Abdominal:      General: Bowel sounds are normal.      Palpations: Abdomen is soft.      Tenderness: There is abdominal tenderness.      Comments: surgical incision healed, CDI   Musculoskeletal:         General: Normal range of motion.      Cervical back: Normal " range of motion.   Skin:     General: Skin is warm and dry.      Coloration: Skin is not pale.      Findings: No rash.   Neurological:      Mental Status: He is alert and oriented to person, place, and time. Mental status is at baseline.   Psychiatric:         Mood and Affect: Mood is depressed.         Speech: Speech normal.         Behavior: Behavior normal. Behavior is cooperative.       Health Maintenance   Topic Date Due    TETANUS VACCINE  Never done    Hemoglobin A1c  05/15/2022    Foot Exam  02/11/2023    Lipid Panel  02/15/2023    Eye Exam  03/08/2023    Low Dose Statin  05/11/2023    Hepatitis C Screening  Completed

## 2022-05-11 NOTE — ASSESSMENT & PLAN NOTE
-condition is currently uncontrolled  -plan to repeat labs today   -see diabetic health maintenance listed below  -on statin: Yes  -on ACE-I/ARB: No  -counseling provided on importance of diabetic diet and medication compliance in order to treat diabetes  -discussed diabetes disease course and potential complications    Diabetes Medications             empagliflozin (JARDIANCE) 25 mg tablet Take 1 tablet (25 mg total) by mouth once daily.    glipiZIDE (GLUCOTROL) 10 MG tablet Take 1 tablet (10 mg total) by mouth 2 (two) times daily with meals.        Lab Results   Component Value Date    HGBA1C 8.2 (H) 02/15/2022

## 2022-05-11 NOTE — ASSESSMENT & PLAN NOTE
- chronic, uncontrolled  - taking liothyonine 25 mcg daily, reports was previously noncompliant with medication. He states since last labs in February 2022 he has been taking as prescribed  - repeat labs today     Lab Results   Component Value Date    TSH 18.393 (H) 02/15/2022

## 2022-05-11 NOTE — ASSESSMENT & PLAN NOTE
Reviewed recent labs with patient. Hypothyroidism and diabetes poorly controlled. Recommend update labs today. Complete history and physical was completed today.  Complete and thorough medication reconciliation was performed.  Discussed risks and benefits of medications.  Advised patient on orders and health maintenance.  Continue current medications listed on your summary sheet.

## 2022-05-11 NOTE — ASSESSMENT & PLAN NOTE
- Chronic, uncontrolled  - Complains of ongoing abdominal pain  - He has been referred to pain management, Dr. Aguirre. Patient states he recommended CBD which his insurance will not cover.   - He has tried taking Tramadol as prescribed by PCP but today reports medication is no longer effective at controlling pain. He is requesting something stronger for pain. Notified patient that I would update his PCP. Also recommend close follow up with pain management.

## 2022-05-12 ENCOUNTER — TELEPHONE (OUTPATIENT)
Dept: FAMILY MEDICINE | Facility: CLINIC | Age: 52
End: 2022-05-12
Payer: MEDICARE

## 2022-05-12 DIAGNOSIS — E78.5 HYPERLIPIDEMIA LDL GOAL <130: ICD-10-CM

## 2022-05-12 DIAGNOSIS — E03.9 ACQUIRED HYPOTHYROIDISM: Primary | ICD-10-CM

## 2022-05-12 DIAGNOSIS — Z79.899 ENCOUNTER FOR LONG-TERM (CURRENT) USE OF MEDICATIONS: ICD-10-CM

## 2022-05-12 LAB
ESTIMATED AVG GLUCOSE: 171 MG/DL (ref 68–131)
HBA1C MFR BLD: 7.6 % (ref 4–5.6)
T3 SERPL-MCNC: 182 NG/DL (ref 60–180)
T4 FREE SERPL-MCNC: 0.5 NG/DL (ref 0.71–1.51)

## 2022-05-12 RX ORDER — TRAMADOL HYDROCHLORIDE 100 MG/1
100 TABLET, EXTENDED RELEASE ORAL DAILY
Qty: 30 TABLET | Refills: 0 | Status: SHIPPED | OUTPATIENT
Start: 2022-05-12

## 2022-05-12 RX ORDER — ATORVASTATIN CALCIUM 40 MG/1
40 TABLET, FILM COATED ORAL DAILY
Qty: 90 TABLET | Refills: 4 | Status: SHIPPED | OUTPATIENT
Start: 2022-05-12 | End: 2023-08-29

## 2022-05-12 RX ORDER — FENOFIBRATE 145 MG/1
145 TABLET, FILM COATED ORAL DAILY
Qty: 90 TABLET | Refills: 4 | Status: SHIPPED | OUTPATIENT
Start: 2022-05-12 | End: 2023-08-05

## 2022-05-12 RX ORDER — THYROID 15 MG/1
15 TABLET ORAL
Qty: 90 TABLET | Refills: 4 | Status: SHIPPED | OUTPATIENT
Start: 2022-05-12 | End: 2023-08-05

## 2022-05-12 NOTE — TELEPHONE ENCOUNTER
I have signed for the following orders AND/OR meds.  Please call the patient and ask the patient to schedule the testing AND/OR inform about any medications that were sent.      No orders of the defined types were placed in this encounter.      Medications Ordered This Encounter   Medications    atorvastatin (LIPITOR) 40 MG tablet     Sig: Take 1 tablet (40 mg total) by mouth once daily.     Dispense:  90 tablet     Refill:  4    fenofibrate (TRICOR) 145 MG tablet     Sig: Take 1 tablet (145 mg total) by mouth once daily.     Dispense:  90 tablet     Refill:  4    thyroid, pork, (ARMOUR THYROID) 15 mg Tab     Sig: Take 1 tablet (15 mg total) by mouth before breakfast.     Dispense:  90 tablet     Refill:  4

## 2022-05-12 NOTE — PROGRESS NOTES
Chart and encounter reviewed.  I recommend starting tramadol 100 milligrams extended release daily.  Patient should follow-up with me in four weeks to discuss future refills.  He also needs to make follow-up appointments with his specialist including pain management, gastroenterology etc..  ER precautions for severe pain.  Follow-up sooner if no improvement.

## 2022-05-12 NOTE — TELEPHONE ENCOUNTER
Pt is an agreement with starting armour thyroid, also increasing his atorvastatin and adding fenofibrate to the vascepa that he takes. Please place all medications needed. Thank you

## 2022-05-14 NOTE — PROGRESS NOTES
Make follow-up lab appointment per recommendation below.  Check to see if patient has seen the results through my chart.  If not then,  #CALL THE PATIENT# to discuss results/see if they have questions and document verification of contact. Make F/U appt if needed. [phone]    #My interpretation that was sent to them through [employer]:  Rashaun, I have reviewed your recent blood work.     Thyroid study is abnormal.  Free T4 levels are consistent with hypothyroidism.  I recommend continuing to take supplement that was prescribed previously for replacement.  Your hemoglobin A1c is improved.  I recommend continue current medications and recheck A1c in six months.  This test is gold standard screening test for diabetes.  It is a measures 3 months of your average blood sugar.    Recheck All labs in six months.  =========================  Also please address any outstanding health maintenance that may be due: TETANUS VACCINE Never done  Sign Pain Contract Never done  Complete Opioid Risk Tool Never done  Colorectal Cancer Screening Never done  Shingles Vaccine(1 of 2) Never done  Diabetes Urine Screening due on 02/27/2021

## 2022-05-31 ENCOUNTER — PATIENT MESSAGE (OUTPATIENT)
Dept: FAMILY MEDICINE | Facility: CLINIC | Age: 52
End: 2022-05-31
Payer: MEDICARE

## 2023-01-25 ENCOUNTER — PATIENT MESSAGE (OUTPATIENT)
Dept: ADMINISTRATIVE | Facility: HOSPITAL | Age: 53
End: 2023-01-25
Payer: COMMERCIAL

## 2023-02-09 ENCOUNTER — PATIENT MESSAGE (OUTPATIENT)
Dept: ADMINISTRATIVE | Facility: HOSPITAL | Age: 53
End: 2023-02-09
Payer: COMMERCIAL

## 2023-04-19 ENCOUNTER — PATIENT MESSAGE (OUTPATIENT)
Dept: ADMINISTRATIVE | Facility: HOSPITAL | Age: 53
End: 2023-04-19
Payer: COMMERCIAL

## 2023-04-19 DIAGNOSIS — E11.9 TYPE 2 DIABETES MELLITUS WITHOUT COMPLICATION: ICD-10-CM

## 2023-07-03 NOTE — PROGRESS NOTES
Make follow-up lab appointment per recommendation below.  Check to see if patient has seen the results through my chart.  If not then,  #CALL THE PATIENT# to discuss results/see if they have questions and document verification of contact. Make F/U appt if needed. [phone]    #My interpretation that was sent to them through [employer]:  Rashaun, I have reviewed your recent blood work.     PSA screening for prostate cancer is within normal limits.  Repeat annually.  Your complete blood count is normal, no anemia.    Your metabolic panel which shows your glucose, kidney function, electrolytes, and liver function is stable except for elevated glucose.  See A1c below.. .   Thyroid study is significantly abnormal consistent with hypothyroidism.  This is likely contributing to your symptoms.  I recommend changing your thyroid medication to [place] Thyroid.  I will send this to the pharmacy.  Please start taking it as soon as possible.  Recheck thyroid labs in two months.  Your cholesterol is elevated from previous.  Worsening total cholesterol and triglycerides.  Elevated risk score. I Recommend increasing the dosage of atorvastatin if you are currently taking this medication and potentially adding fenofibrate to Vascepa.  Let me know if your in agreement I will send the medications to the pharmacy.  Your hemoglobin A1c is 8.2 which correlates to an average blood sugar of 189 over the last three months.  Better control of glucose is needed to avoid complications of diabetes.  I recommend low-carbohydrate diet.  If no improvement in blood sugars we will need to adjust medications.  This test is gold standard screening test for diabetes.  It is a measures 3 months of your average blood sugar.  Recheck A1c in three months.    The 10-year ASCVD risk score ([place] H[place] Jr., et al., 2013) is: 15.2%    Values used to calculate the score:      Age: 51 years      Sex: Male      Is Non- : No      Diabetic: Yes       Tobacco smoker: No      Systolic Blood Pressure: 138 mmHg      Is BP treated: No      HDL Cholesterol: 35 mg/dL      Total Cholesterol: 231 mg/dL  =========================  Also please address any outstanding health maintenance that may be due: TETANUS VACCINE Never done  Colorectal Cancer Screening Never done  Shingles Vaccine(1 of 2) Never done  Diabetes Urine Screening due on 02/27/2021 For information on Fall & Injury Prevention, visit: [email or web address] OR  [email or web address] OR  https://www.cdc.gov/steadi/patient.html

## 2023-08-29 DIAGNOSIS — E78.5 HYPERLIPIDEMIA LDL GOAL <130: ICD-10-CM

## 2023-08-29 DIAGNOSIS — Z79.899 ENCOUNTER FOR LONG-TERM (CURRENT) USE OF MEDICATIONS: ICD-10-CM

## 2023-08-29 RX ORDER — ATORVASTATIN CALCIUM 40 MG/1
40 TABLET, FILM COATED ORAL
Qty: 90 TABLET | Refills: 0 | Status: SHIPPED | OUTPATIENT
Start: 2023-08-29

## 2023-08-29 NOTE — TELEPHONE ENCOUNTER
Care Due:                  Date            Visit Type   Department     Provider  --------------------------------------------------------------------------------                                EP -                              PRIMARY      [facility]  Last Visit: 02-      CARE ([facility])   MEDICINE       Donnie Shrestha  Next Visit: None Scheduled  None         None Found                                                            Last  Test          Frequency    Reason                     Performed    Due Date  --------------------------------------------------------------------------------    Office Visit  12 months..  atorvastatin,              02- 02-                             empagliflozin,                             fenofibrate, glipiZIDE,                             icosapent, liothyronine,                             pantoprazole, thyroid,...    CBC.........  12 months..  fenofibrate..............  02-   02-    CMP.........  12 months..  atorvastatin,              02-   02-                             empagliflozin,                             fenofibrate, glipiZIDE,                             icosapent................    HBA1C.......  6 months...  empagliflozin, glipiZIDE.  05- 11-    Lipid Panel.  12 months..  atorvastatin,              02-   02-                             fenofibrate, icosapent...    TSH.........  12 months..  liothyronine, thyroid,...  02-   02-    [facility] Embedded Care Due Messages. Reference number: [id number].   8/29/2023 10:26:31 AM CDT

## 2023-08-29 NOTE — TELEPHONE ENCOUNTER
I have attempted to contact this patient by phone with the following results: no answer mail box full.

## 2023-08-29 NOTE — TELEPHONE ENCOUNTER
Refill Routing Note     Refill Routing Note   Medication(s) are not appropriate for processing by [facility] for the following reason(s):      Patient not seen by provider within 15 months  Required labs outdated    [facility] action(s):  Defer Care Due:  Appointment due  Labs due            Appointments  past 12m or future 3m with PCP    Date Provider   Last Visit   2/11/2022 Donnie Shrestha MD   Next Visit   Visit date not found Donnie Shrestha MD   ED visits in past 90 days: 0        Note composed:1:02 PM 08/29/2023

## 2024-01-09 NOTE — LETTER
AUTHORIZATION FOR RELEASE OF   CONFIDENTIAL INFORMATION      We are seeing Rashaun Mccormick, date of birth 1970, in the clinic at [facility]. Donnie Shrestha MD is the patient's PCP. Rashaun Mccormick has an outstanding lab/procedure at the time we reviewed his chart. In order to help keep his health information updated, he has authorized us to request the following medical record(s):        (  )  MAMMOGRAM                                      (  )  COLONOSCOPY      (  )  PAP SMEAR                                          (  )  OUTSIDE LAB RESULTS     (  )  DEXA SCAN                                          ( X )  EYE EXAM            (  )  FOOT EXAM                                          (  )  ENTIRE RECORD     (  )  OUTSIDE IMMUNIZATIONS                 (  )  _______________         Please fax records to [facility], [phone]     If you have any questions, please contact Jay[facility] Cleaning            Patient Name: Rashaun Mccormick  : 1970  Patient Phone #: [phone]      Yes

## 2024-05-18 ENCOUNTER — PATIENT MESSAGE (OUTPATIENT)
Dept: FAMILY MEDICINE | Facility: CLINIC | Age: 54
End: 2024-05-18
Payer: COMMERCIAL

## 2024-09-11 ENCOUNTER — PATIENT MESSAGE (OUTPATIENT)
Dept: FAMILY MEDICINE | Facility: CLINIC | Age: 54
End: 2024-09-11
Payer: COMMERCIAL